# Patient Record
Sex: MALE | Race: BLACK OR AFRICAN AMERICAN
[De-identification: names, ages, dates, MRNs, and addresses within clinical notes are randomized per-mention and may not be internally consistent; named-entity substitution may affect disease eponyms.]

---

## 2020-09-27 ENCOUNTER — HOSPITAL ENCOUNTER (EMERGENCY)
Age: 65
Discharge: HOME | End: 2020-09-27
Payer: MEDICARE

## 2020-09-27 VITALS
DIASTOLIC BLOOD PRESSURE: 68 MMHG | HEART RATE: 74 BPM | SYSTOLIC BLOOD PRESSURE: 108 MMHG | RESPIRATION RATE: 16 BRPM | OXYGEN SATURATION: 95 %

## 2020-09-27 VITALS
OXYGEN SATURATION: 98 % | RESPIRATION RATE: 14 BRPM | SYSTOLIC BLOOD PRESSURE: 80 MMHG | TEMPERATURE: 97.34 F | HEART RATE: 83 BPM | DIASTOLIC BLOOD PRESSURE: 58 MMHG

## 2020-09-27 VITALS — SYSTOLIC BLOOD PRESSURE: 80 MMHG | DIASTOLIC BLOOD PRESSURE: 58 MMHG

## 2020-09-27 VITALS — BODY MASS INDEX: 44.3 KG/M2

## 2020-09-27 DIAGNOSIS — I50.9: ICD-10-CM

## 2020-09-27 DIAGNOSIS — Z79.4: ICD-10-CM

## 2020-09-27 DIAGNOSIS — Z86.73: ICD-10-CM

## 2020-09-27 DIAGNOSIS — Z99.2: ICD-10-CM

## 2020-09-27 DIAGNOSIS — E66.9: ICD-10-CM

## 2020-09-27 DIAGNOSIS — E78.5: ICD-10-CM

## 2020-09-27 DIAGNOSIS — G89.29: ICD-10-CM

## 2020-09-27 DIAGNOSIS — N18.6: ICD-10-CM

## 2020-09-27 DIAGNOSIS — D63.1: ICD-10-CM

## 2020-09-27 DIAGNOSIS — M54.9: ICD-10-CM

## 2020-09-27 DIAGNOSIS — K21.9: ICD-10-CM

## 2020-09-27 DIAGNOSIS — E11.22: ICD-10-CM

## 2020-09-27 DIAGNOSIS — I13.2: ICD-10-CM

## 2020-09-27 DIAGNOSIS — R42: Primary | ICD-10-CM

## 2020-09-27 LAB
ANION GAP: 7 (ref 5–15)
BUN SERPL-MCNC: 26 MG/DL (ref 7–18)
BUN/CREAT RATIO: 3.7 RATIO (ref 10–20)
CALCIUM SERPL-MCNC: 9 MG/DL (ref 8.5–10.1)
CARBON DIOXIDE: 31 MMOL/L (ref 21–32)
CHLORIDE: 96 MMOL/L (ref 98–107)
DEPRECATED RDW RBC: 43.9 FL (ref 35.1–43.9)
ERYTHROCYTE [DISTWIDTH] IN BLOOD: 13.3 % (ref 11.6–14.6)
EST GLOM FILT RATE - AFR AMER: 10 ML/MIN (ref 60–?)
ESTIMATED CREATININE CLEARANCE: 10.45 ML/MIN
GLUCOSE: 124 MG/DL (ref 74–106)
HCT VFR BLD AUTO: 35.7 % (ref 40–54)
HEMOGLOBIN: 11.6 G/DL (ref 13–16.5)
HGB BLD-MCNC: 11.6 G/DL (ref 13–16.5)
IMMATURE GRANULOCYTES COUNT: 0.02 X10^3/UL (ref 0–0)
MCV RBC: 90.6 FL (ref 80–94)
MEAN CORP HGB CONC: 32.5 G/DL (ref 32–36)
MEAN PLATELET VOL.: 9.8 FL (ref 6.2–12)
NRBC FLAGGED BY ANALYZER: 0 % (ref 0–5)
PLATELET # BLD: 273 K/MM3 (ref 150–450)
PLATELET COUNT: 273 K/MM3 (ref 150–450)
POTASSIUM: 3.4 MMOL/L (ref 3.5–5.1)
RBC # BLD AUTO: 3.94 M/MM3 (ref 4.6–6.2)
RBC DISTRIBUTION WIDTH CV: 13.3 % (ref 11.6–14.6)
RBC DISTRIBUTION WIDTH SD: 43.9 FL (ref 35.1–43.9)
WBC # BLD AUTO: 8.3 K/MM3 (ref 4.4–11)
WHITE BLOOD COUNT: 8.3 K/MM3 (ref 4.4–11)

## 2020-09-27 PROCEDURE — A4216 STERILE WATER/SALINE, 10 ML: HCPCS

## 2020-09-27 PROCEDURE — 99283 EMERGENCY DEPT VISIT LOW MDM: CPT

## 2020-09-27 PROCEDURE — 80048 BASIC METABOLIC PNL TOTAL CA: CPT

## 2020-09-27 PROCEDURE — 85025 COMPLETE CBC W/AUTO DIFF WBC: CPT

## 2020-09-27 PROCEDURE — 93005 ELECTROCARDIOGRAM TRACING: CPT

## 2020-10-21 ENCOUNTER — APPOINTMENT (OUTPATIENT)
Dept: GENERAL RADIOLOGY | Age: 65
DRG: 149 | End: 2020-10-21
Payer: MEDICARE

## 2020-10-21 ENCOUNTER — HOSPITAL ENCOUNTER (INPATIENT)
Age: 65
LOS: 1 days | Discharge: HOME | DRG: 149 | End: 2020-10-23
Attending: STUDENT IN AN ORGANIZED HEALTH CARE EDUCATION/TRAINING PROGRAM | Admitting: FAMILY MEDICINE
Payer: MEDICARE

## 2020-10-21 ENCOUNTER — APPOINTMENT (OUTPATIENT)
Dept: MRI IMAGING | Age: 65
DRG: 149 | End: 2020-10-21
Payer: MEDICARE

## 2020-10-21 ENCOUNTER — APPOINTMENT (OUTPATIENT)
Dept: CT IMAGING | Age: 65
DRG: 149 | End: 2020-10-21
Payer: MEDICARE

## 2020-10-21 PROBLEM — G45.9 TIA (TRANSIENT ISCHEMIC ATTACK): Status: ACTIVE | Noted: 2020-10-21

## 2020-10-21 LAB
ABO/RH: NORMAL
ALBUMIN SERPL-MCNC: 3.9 G/DL (ref 3.5–4.6)
ALP BLD-CCNC: 227 U/L (ref 35–104)
ALT SERPL-CCNC: 10 U/L (ref 0–41)
ANION GAP SERPL CALCULATED.3IONS-SCNC: 15 MEQ/L (ref 9–15)
ANTIBODY SCREEN: NORMAL
APTT: 33.6 SEC (ref 24.4–36.8)
AST SERPL-CCNC: 15 U/L (ref 0–40)
BASOPHILS ABSOLUTE: 0.1 K/UL (ref 0–0.2)
BASOPHILS RELATIVE PERCENT: 1 %
BILIRUB SERPL-MCNC: 0.5 MG/DL (ref 0.2–0.7)
BUN BLDV-MCNC: 28 MG/DL (ref 8–23)
C-REACTIVE PROTEIN, HIGH SENSITIVITY: 28.6 MG/L (ref 0–5)
CALCIUM SERPL-MCNC: 9.7 MG/DL (ref 8.5–9.9)
CHLORIDE BLD-SCNC: 93 MEQ/L (ref 95–107)
CHP ED QC CHECK: YES
CO2: 26 MEQ/L (ref 20–31)
CREAT SERPL-MCNC: 7.49 MG/DL (ref 0.7–1.2)
EKG ATRIAL RATE: 77 BPM
EKG P AXIS: 41 DEGREES
EKG P-R INTERVAL: 182 MS
EKG Q-T INTERVAL: 424 MS
EKG QRS DURATION: 120 MS
EKG QTC CALCULATION (BAZETT): 479 MS
EKG R AXIS: -75 DEGREES
EKG T AXIS: 83 DEGREES
EKG VENTRICULAR RATE: 77 BPM
EOSINOPHILS ABSOLUTE: 0 K/UL (ref 0–0.7)
EOSINOPHILS RELATIVE PERCENT: 0.5 %
GFR AFRICAN AMERICAN: 8.9
GFR NON-AFRICAN AMERICAN: 7.3
GLOBULIN: 3.9 G/DL (ref 2.3–3.5)
GLUCOSE BLD-MCNC: 134 MG/DL (ref 60–115)
GLUCOSE BLD-MCNC: 171 MG/DL
GLUCOSE BLD-MCNC: 176 MG/DL (ref 60–115)
GLUCOSE BLD-MCNC: 196 MG/DL (ref 70–99)
GLUCOSE BLD-MCNC: 248 MG/DL (ref 60–115)
HCT VFR BLD CALC: 41.7 % (ref 42–52)
HEMOGLOBIN: 13.4 G/DL (ref 14–18)
INR BLD: 1.1
LYMPHOCYTES ABSOLUTE: 1.5 K/UL (ref 1–4.8)
LYMPHOCYTES RELATIVE PERCENT: 20.9 %
MAGNESIUM: 2.2 MG/DL (ref 1.7–2.4)
MCH RBC QN AUTO: 28.9 PG (ref 27–31.3)
MCHC RBC AUTO-ENTMCNC: 32.2 % (ref 33–37)
MCV RBC AUTO: 89.8 FL (ref 80–100)
MONOCYTES ABSOLUTE: 0.6 K/UL (ref 0.2–0.8)
MONOCYTES RELATIVE PERCENT: 8.6 %
NEUTROPHILS ABSOLUTE: 5.1 K/UL (ref 1.4–6.5)
NEUTROPHILS RELATIVE PERCENT: 69 %
PDW BLD-RTO: 14.7 % (ref 11.5–14.5)
PERFORMED ON: ABNORMAL
PLATELET # BLD: 374 K/UL (ref 130–400)
POTASSIUM SERPL-SCNC: 3.7 MEQ/L (ref 3.4–4.9)
PROTHROMBIN TIME: 14.6 SEC (ref 12.3–14.9)
RBC # BLD: 4.65 M/UL (ref 4.7–6.1)
SODIUM BLD-SCNC: 134 MEQ/L (ref 135–144)
TOTAL CK: 99 U/L (ref 0–190)
TOTAL PROTEIN: 7.8 G/DL (ref 6.3–8)
TROPONIN: 0.31 NG/ML (ref 0–0.01)
WBC # BLD: 7.4 K/UL (ref 4.8–10.8)

## 2020-10-21 PROCEDURE — 84484 ASSAY OF TROPONIN QUANT: CPT

## 2020-10-21 PROCEDURE — 6370000000 HC RX 637 (ALT 250 FOR IP): Performed by: NURSE PRACTITIONER

## 2020-10-21 PROCEDURE — 6360000004 HC RX CONTRAST MEDICATION: Performed by: STUDENT IN AN ORGANIZED HEALTH CARE EDUCATION/TRAINING PROGRAM

## 2020-10-21 PROCEDURE — 70498 CT ANGIOGRAPHY NECK: CPT

## 2020-10-21 PROCEDURE — 83735 ASSAY OF MAGNESIUM: CPT

## 2020-10-21 PROCEDURE — 96374 THER/PROPH/DIAG INJ IV PUSH: CPT

## 2020-10-21 PROCEDURE — 71045 X-RAY EXAM CHEST 1 VIEW: CPT

## 2020-10-21 PROCEDURE — 6360000002 HC RX W HCPCS: Performed by: FAMILY MEDICINE

## 2020-10-21 PROCEDURE — 93010 ELECTROCARDIOGRAM REPORT: CPT | Performed by: INTERNAL MEDICINE

## 2020-10-21 PROCEDURE — 93005 ELECTROCARDIOGRAM TRACING: CPT | Performed by: STUDENT IN AN ORGANIZED HEALTH CARE EDUCATION/TRAINING PROGRAM

## 2020-10-21 PROCEDURE — 70450 CT HEAD/BRAIN W/O DYE: CPT

## 2020-10-21 PROCEDURE — 86850 RBC ANTIBODY SCREEN: CPT

## 2020-10-21 PROCEDURE — G0378 HOSPITAL OBSERVATION PER HR: HCPCS

## 2020-10-21 PROCEDURE — 36415 COLL VENOUS BLD VENIPUNCTURE: CPT

## 2020-10-21 PROCEDURE — 86901 BLOOD TYPING SEROLOGIC RH(D): CPT

## 2020-10-21 PROCEDURE — 86141 C-REACTIVE PROTEIN HS: CPT

## 2020-10-21 PROCEDURE — 82550 ASSAY OF CK (CPK): CPT

## 2020-10-21 PROCEDURE — 85730 THROMBOPLASTIN TIME PARTIAL: CPT

## 2020-10-21 PROCEDURE — 80053 COMPREHEN METABOLIC PANEL: CPT

## 2020-10-21 PROCEDURE — 6370000000 HC RX 637 (ALT 250 FOR IP): Performed by: FAMILY MEDICINE

## 2020-10-21 PROCEDURE — 86900 BLOOD TYPING SEROLOGIC ABO: CPT

## 2020-10-21 PROCEDURE — 6370000000 HC RX 637 (ALT 250 FOR IP): Performed by: INTERNAL MEDICINE

## 2020-10-21 PROCEDURE — 85610 PROTHROMBIN TIME: CPT

## 2020-10-21 PROCEDURE — 70496 CT ANGIOGRAPHY HEAD: CPT

## 2020-10-21 PROCEDURE — 96372 THER/PROPH/DIAG INJ SC/IM: CPT

## 2020-10-21 PROCEDURE — 2580000003 HC RX 258: Performed by: FAMILY MEDICINE

## 2020-10-21 PROCEDURE — 85025 COMPLETE CBC W/AUTO DIFF WBC: CPT

## 2020-10-21 PROCEDURE — 99285 EMERGENCY DEPT VISIT HI MDM: CPT

## 2020-10-21 PROCEDURE — 70551 MRI BRAIN STEM W/O DYE: CPT

## 2020-10-21 RX ORDER — ATORVASTATIN CALCIUM 40 MG/1
40 TABLET, FILM COATED ORAL NIGHTLY
COMMUNITY
Start: 2019-11-19

## 2020-10-21 RX ORDER — DEXTROSE MONOHYDRATE 50 MG/ML
100 INJECTION, SOLUTION INTRAVENOUS PRN
Status: DISCONTINUED | OUTPATIENT
Start: 2020-10-21 | End: 2020-10-23 | Stop reason: HOSPADM

## 2020-10-21 RX ORDER — ATORVASTATIN CALCIUM 40 MG/1
80 TABLET, FILM COATED ORAL NIGHTLY
Status: DISCONTINUED | OUTPATIENT
Start: 2020-10-21 | End: 2020-10-23 | Stop reason: HOSPADM

## 2020-10-21 RX ORDER — ERGOCALCIFEROL (VITAMIN D2) 1250 MCG
50000 CAPSULE ORAL WEEKLY
COMMUNITY

## 2020-10-21 RX ORDER — SENNA PLUS 8.6 MG/1
1 TABLET ORAL NIGHTLY
Status: DISCONTINUED | OUTPATIENT
Start: 2020-10-21 | End: 2020-10-23 | Stop reason: HOSPADM

## 2020-10-21 RX ORDER — PRAVASTATIN SODIUM 40 MG
40 TABLET ORAL DAILY
Status: ON HOLD | COMMUNITY
End: 2020-10-22 | Stop reason: HOSPADM

## 2020-10-21 RX ORDER — MECLIZINE HCL 12.5 MG/1
12.5 TABLET ORAL EVERY 6 HOURS PRN
Status: ON HOLD | COMMUNITY
Start: 2020-09-22 | End: 2020-10-22 | Stop reason: HOSPADM

## 2020-10-21 RX ORDER — FAMOTIDINE 20 MG/1
20 TABLET, FILM COATED ORAL 2 TIMES DAILY
COMMUNITY
Start: 2020-03-13

## 2020-10-21 RX ORDER — ASPIRIN 81 MG/1
81 TABLET ORAL DAILY
Status: DISCONTINUED | OUTPATIENT
Start: 2020-10-21 | End: 2020-10-23 | Stop reason: HOSPADM

## 2020-10-21 RX ORDER — CILOSTAZOL 50 MG/1
50 TABLET ORAL 2 TIMES DAILY
COMMUNITY

## 2020-10-21 RX ORDER — MIDODRINE HYDROCHLORIDE 10 MG/1
10 TABLET ORAL 3 TIMES DAILY
COMMUNITY
Start: 2019-11-19

## 2020-10-21 RX ORDER — OXYCODONE AND ACETAMINOPHEN 7.5; 325 MG/1; MG/1
1 TABLET ORAL EVERY 6 HOURS PRN
Status: ON HOLD | COMMUNITY
Start: 2020-09-16 | End: 2020-10-22 | Stop reason: HOSPADM

## 2020-10-21 RX ORDER — SODIUM CHLORIDE 0.9 % (FLUSH) 0.9 %
10 SYRINGE (ML) INJECTION PRN
Status: DISCONTINUED | OUTPATIENT
Start: 2020-10-21 | End: 2020-10-23 | Stop reason: HOSPADM

## 2020-10-21 RX ORDER — METHOCARBAMOL 500 MG/1
500 TABLET, FILM COATED ORAL 3 TIMES DAILY PRN
COMMUNITY
Start: 2020-08-07

## 2020-10-21 RX ORDER — ONDANSETRON 2 MG/ML
4 INJECTION INTRAMUSCULAR; INTRAVENOUS EVERY 6 HOURS PRN
Status: DISCONTINUED | OUTPATIENT
Start: 2020-10-21 | End: 2020-10-23 | Stop reason: HOSPADM

## 2020-10-21 RX ORDER — NICOTINE POLACRILEX 4 MG
15 LOZENGE BUCCAL PRN
Status: DISCONTINUED | OUTPATIENT
Start: 2020-10-21 | End: 2020-10-23 | Stop reason: HOSPADM

## 2020-10-21 RX ORDER — DOCUSATE SODIUM 100 MG/1
100 CAPSULE, LIQUID FILLED ORAL 2 TIMES DAILY
COMMUNITY

## 2020-10-21 RX ORDER — ASPIRIN 81 MG/1
81 TABLET, CHEWABLE ORAL DAILY
COMMUNITY

## 2020-10-21 RX ORDER — ALPRAZOLAM 0.5 MG/1
0.5 TABLET ORAL ONCE
Status: DISCONTINUED | OUTPATIENT
Start: 2020-10-21 | End: 2020-10-23 | Stop reason: HOSPADM

## 2020-10-21 RX ORDER — VITAMIN B COMPLEX
1 CAPSULE ORAL DAILY
COMMUNITY

## 2020-10-21 RX ORDER — DEXTROSE MONOHYDRATE 25 G/50ML
12.5 INJECTION, SOLUTION INTRAVENOUS PRN
Status: DISCONTINUED | OUTPATIENT
Start: 2020-10-21 | End: 2020-10-23 | Stop reason: HOSPADM

## 2020-10-21 RX ORDER — PROMETHAZINE HYDROCHLORIDE 25 MG/1
12.5 TABLET ORAL EVERY 6 HOURS PRN
Status: DISCONTINUED | OUTPATIENT
Start: 2020-10-21 | End: 2020-10-23 | Stop reason: HOSPADM

## 2020-10-21 RX ORDER — HYDROXYZINE HYDROCHLORIDE 25 MG/1
25 TABLET, FILM COATED ORAL DAILY PRN
COMMUNITY
Start: 2020-07-29

## 2020-10-21 RX ORDER — HEPARIN SODIUM 5000 [USP'U]/ML
5000 INJECTION, SOLUTION INTRAVENOUS; SUBCUTANEOUS EVERY 8 HOURS SCHEDULED
Status: DISCONTINUED | OUTPATIENT
Start: 2020-10-21 | End: 2020-10-23 | Stop reason: HOSPADM

## 2020-10-21 RX ORDER — LORAZEPAM 2 MG/ML
0.5 INJECTION INTRAMUSCULAR ONCE
Status: COMPLETED | OUTPATIENT
Start: 2020-10-21 | End: 2020-10-21

## 2020-10-21 RX ORDER — POLYETHYLENE GLYCOL 3350 17 G/17G
17 POWDER, FOR SOLUTION ORAL DAILY
Status: DISCONTINUED | OUTPATIENT
Start: 2020-10-21 | End: 2020-10-23 | Stop reason: HOSPADM

## 2020-10-21 RX ORDER — CLOPIDOGREL BISULFATE 75 MG/1
75 TABLET ORAL DAILY
COMMUNITY
Start: 2020-05-28

## 2020-10-21 RX ORDER — ASPIRIN 81 MG/1
100 TABLET ORAL 2 TIMES DAILY PRN
Status: ON HOLD | COMMUNITY
Start: 2020-07-29 | End: 2020-10-22 | Stop reason: HOSPADM

## 2020-10-21 RX ORDER — SUCROFERRIC OXYHYDROXIDE 500 MG/1
1 TABLET, CHEWABLE ORAL
COMMUNITY

## 2020-10-21 RX ORDER — POLYETHYLENE GLYCOL 3350 17 G/17G
17 POWDER, FOR SOLUTION ORAL DAILY PRN
Status: DISCONTINUED | OUTPATIENT
Start: 2020-10-21 | End: 2020-10-21

## 2020-10-21 RX ORDER — ONDANSETRON 4 MG/1
4 TABLET, ORALLY DISINTEGRATING ORAL EVERY 4 HOURS PRN
COMMUNITY
Start: 2020-06-16

## 2020-10-21 RX ORDER — NALOXONE HYDROCHLORIDE 4 MG/.1ML
4 SPRAY NASAL ONCE
COMMUNITY
Start: 2020-08-07

## 2020-10-21 RX ORDER — LISINOPRIL 2.5 MG/1
2.5 TABLET ORAL DAILY
COMMUNITY

## 2020-10-21 RX ORDER — HYDROCODONE BITARTRATE AND ACETAMINOPHEN 10; 325 MG/1; MG/1
1 TABLET ORAL 4 TIMES DAILY PRN
Status: ON HOLD | COMMUNITY
Start: 2020-08-18 | End: 2020-10-22 | Stop reason: HOSPADM

## 2020-10-21 RX ORDER — ASPIRIN 300 MG/1
300 SUPPOSITORY RECTAL DAILY
Status: DISCONTINUED | OUTPATIENT
Start: 2020-10-21 | End: 2020-10-23 | Stop reason: HOSPADM

## 2020-10-21 RX ORDER — SODIUM CHLORIDE 0.9 % (FLUSH) 0.9 %
10 SYRINGE (ML) INJECTION EVERY 12 HOURS SCHEDULED
Status: DISCONTINUED | OUTPATIENT
Start: 2020-10-21 | End: 2020-10-23 | Stop reason: HOSPADM

## 2020-10-21 RX ADMIN — IOPAMIDOL 100 ML: 612 INJECTION, SOLUTION INTRAVENOUS at 12:33

## 2020-10-21 RX ADMIN — LORAZEPAM 0.5 MG: 2 INJECTION INTRAMUSCULAR; INTRAVENOUS at 15:18

## 2020-10-21 RX ADMIN — HEPARIN SODIUM 5000 UNITS: 5000 INJECTION INTRAVENOUS; SUBCUTANEOUS at 19:38

## 2020-10-21 RX ADMIN — Medication 10 ML: at 22:37

## 2020-10-21 RX ADMIN — POLYETHYLENE GLYCOL 3350 17 G: 17 POWDER, FOR SOLUTION ORAL at 19:14

## 2020-10-21 RX ADMIN — Medication 8.6 MG: at 22:36

## 2020-10-21 RX ADMIN — INSULIN LISPRO 2 UNITS: 100 INJECTION, SOLUTION INTRAVENOUS; SUBCUTANEOUS at 22:36

## 2020-10-21 RX ADMIN — ATORVASTATIN CALCIUM 80 MG: 40 TABLET, FILM COATED ORAL at 22:36

## 2020-10-21 ASSESSMENT — PAIN DESCRIPTION - PROGRESSION: CLINICAL_PROGRESSION: NOT CHANGED

## 2020-10-21 ASSESSMENT — PAIN DESCRIPTION - PAIN TYPE
TYPE: ACUTE PAIN
TYPE: ACUTE PAIN

## 2020-10-21 ASSESSMENT — ENCOUNTER SYMPTOMS
ABDOMINAL PAIN: 0
TROUBLE SWALLOWING: 0
COUGH: 0
VOMITING: 0
DIARRHEA: 0
SHORTNESS OF BREATH: 0
CHEST TIGHTNESS: 0
BACK PAIN: 0
ALLERGIC/IMMUNOLOGIC NEGATIVE: 1
CONSTIPATION: 1
RESPIRATORY NEGATIVE: 1
SINUS PRESSURE: 0

## 2020-10-21 ASSESSMENT — PAIN DESCRIPTION - LOCATION
LOCATION: LEG
LOCATION: LEG;BACK

## 2020-10-21 ASSESSMENT — PAIN DESCRIPTION - ORIENTATION
ORIENTATION: LEFT
ORIENTATION: LEFT

## 2020-10-21 ASSESSMENT — PAIN SCALES - GENERAL: PAINLEVEL_OUTOF10: 8

## 2020-10-21 ASSESSMENT — PAIN DESCRIPTION - ONSET: ONSET: ON-GOING

## 2020-10-21 NOTE — ED NOTES
Security has pt wallet with cash in it     Valeriano Dietrich, [facility]  10/21/20 9382

## 2020-10-21 NOTE — ACP (ADVANCE CARE PLANNING)
Advance Care Planning     Advance Care Planning Activator (Inpatient)  Conversation Note      Date of ACP Conversation: 10/21/2020    Conversation Conducted with: Patient with Decision Making Capacity    ACP Activator: [address] makes decisions on behalf of the incapacitated patient: Decision Maker is asked to consider and make decisions based on patient values, known preferences, or best interests. Health Care Decision Maker:     Current Designated Health Care Decision Maker:   (If there is a valid [address] named in the [address] [place] [address] Makers\" box in the ACP activity, but it is not visible above, be sure to open that field and then select the health care decision maker relationship (ie \"primary\") in the blank space to the right of the name.) Validate  this information as still accurate & up-to-date; edit [address] field as needed.)    Note: Assess and validate information in current ACP documents, as indicated. If no Decision Maker listed above or available through scanned documents, then:    If no Authorized Decision Maker has previously been identified, then patient chooses [address]:  \"Who would you like to name as your primary health care decision-maker? \"               Name: Latrice Mason        Relationship: brother          Phone number: [phone]  Jerry Grimaldo this person be reached easily? \" Yes     Note: If the relationship of these Decision-Makers to the patient does NOT follow your state's Next of Kin hierarchy, recommend that patient complete ACP document that meets state-specific requirements to allow them to act on the patient's behalf when appropriate. Care Preferences    Ventilation: \"If you were in your present state of health and suddenly became very ill and were unable to breathe on your own, what would your preference be about the use of a ventilator (breathing machine) if it were available to you? \" Would the patient desire the use of ventilator (breathing machine)?: yes    \"If your health worsens and it becomes clear that your chance of recovery is unlikely, what would your preference be about the use of a ventilator (breathing machine) if it were available to you? \"     Would the patient desire the use of ventilator (breathing machine)?: Yes      Resuscitation  \"CPR works best to restart the heart when there is a sudden event, like a heart attack, in someone who is otherwise healthy. Unfortunately, CPR does not typically restart the heart for people who have serious health conditions or who are very sick. \"    \"In the event your heart stopped as a result of an underlying serious health condition, would you want attempts to be made to restart your heart (answer \"yes\" for attempt to resuscitate) or would you prefer a natural death (answer \"no\" for do not attempt to resuscitate)? \" yes      NOTE: If the patient has a valid advance directive AND now provides care preference(s) that are inconsistent with that prior directive, advise the patient to consider either: creating a new advance directive that complies with state-specific requirements; or, if that is not possible, orally revoking that prior directive in accordance with state-specific requirements, which must be documented in the EHR. [x] Yes   [] No   Educated Patient / Macy Byrne regarding differences between Advance Directives and portable DNR orders.     Length of ACP Conversation in minutes:  10  Conversation Outcomes:  [x] ACP discussion completed  [] Existing advance directive reviewed with patient; no changes to patient's previously recorded wishes  [] New Advance Directive completed  [] Portable Do Not Rescitate prepared for Provider review and signature  [] POLST/POST/MOLST/MOST prepared for Provider review and signature      Follow-up plan:    [] Schedule follow-up conversation to continue planning  [] Referred individual to Provider for additional questions/concerns   [] Advised patient/agent/surrogate to review completed ACP document and update if needed with changes in condition, patient preferences or care setting    [x] This note routed to one or more involved healthcare providers

## 2020-10-21 NOTE — H&P
Hospitalist History and Physical  Name: Christopher Morris  Age: 71 y.o. Gender: male  CodeStatus: Full Code  Allergies: No Known Allergies    Chief Complaint:Dizziness (at [facility], worse when moving head)    Primary Care Provider: Josiah España MD  InpatientTreatment Team: Treatment Team: Attending Provider: Claudetta Pallas, DO; Consulting Physician: Dariela Henderson MD  Admission Date: 10/21/2020      Subjective: Patient is a 43-year-old male with past medical history of CVA with residual right sided weakness, end-stage renal disease on hemodialysis, type 2 diabetes, hypertension and hyperlipidemia and chronic vertigo who presented to the emergency room from [facility] with complaints of dizziness and blurred vision. Per EMR patient has been seen multiple times for dizziness and diplopia. Patient recently prescribed meclizine from [facility] on 9/27/2020. Patient has diagnosis of vertigo since 2014. Exam is nonfocal.  No neurological deficits noted other than the residual right-sided weakness. Today patient's main concern is his left hip pain and constipation. Patient also seen at [facility] wound care center for MSSA wound infection of left thigh. Wound is healed and no sign of cellulitis. Patient has recently finished vancomycin. Patient states that he had a bowel movement yesterday after 2 enemas however still feels constipated. Patient notes hemodialysis yesterday and removal of 2 L. Which may contribute to his dizziness. Physical Exam  Constitutional:       Appearance: He is obese. HENT:      Head: Normocephalic and atraumatic. Right Ear: External ear normal.      Left Ear: External ear normal.      Mouth/Throat:      Pharynx: Oropharynx is clear. Eyes:      Extraocular Movements: Extraocular movements intact. Conjunctiva/sclera: Conjunctivae normal.      Pupils: Pupils are equal, round, and reactive to light.    Neck:      Musculoskeletal: Normal range of motion. Cardiovascular:      Rate and Rhythm: Normal rate and regular rhythm. Pulses: Normal pulses. Heart sounds: Normal heart sounds. Pulmonary:      Effort: Pulmonary effort is normal.      Breath sounds: Normal breath sounds. Abdominal:      General: Bowel sounds are normal.      Palpations: Abdomen is soft. Musculoskeletal: Normal range of motion. Skin:     General: Skin is warm. Capillary Refill: Capillary refill takes less than 2 seconds. Findings: Lesion present. Neurological:      General: No focal deficit present. Mental Status: He is alert and oriented to person, place, and time. Psychiatric:         Mood and Affect: Mood normal.         Review of Systems   Constitutional: Negative. HENT: Negative. Eyes: Positive for visual disturbance. Respiratory: Negative. Cardiovascular: Negative. Gastrointestinal: Positive for constipation. Endocrine: Negative. Genitourinary: Negative. Musculoskeletal: Positive for arthralgias. Skin: Negative. Allergic/Immunologic: Negative. Neurological: Positive for dizziness. Hematological: Negative. Psychiatric/Behavioral: Negative. Medications:  Reviewed     No current facility-administered medications on file prior to encounter. No current outpatient medications on file prior to encounter. Past Medical History:   Diagnosis Date    Arthritis     Cerebral artery occlusion with cerebral infarction ([address]     CH[facility] failure[facility]     Diabetes mellitus [facility]     Hypertension     Renal disorder        Past Surgical History:   Procedure Laterality Date    DIALYSIS FISTULA CREATION          History reviewed. No pertinent family history.      Social History     Socioeconomic History    Marital status:      Spouse name: Not on file    Number of children: Not on file    Years of education: Not on file    Highest education level: Not on file   Occupational History    Not on file   Social Needs    Financial resource strain: Not on file    Food insecurity     Worry: Not on file     Inability: Not on file    Transportation needs     Medical: Not on file     Non-medical: Not on file   Tobacco Use    Smoking status: Never Smoker    Smokeless tobacco: Former User   Substance and Sexual Activity    Alcohol use: Not on file     Comment: rarely    Drug use: Never    Sexual activity: Not on file   Lifestyle    Physical activity     Days per week: Not on file     Minutes per session: Not on file    Stress: Not on file   Relationships    Social connections     Talks on phone: Not on file     Gets together: Not on file     Attends Yarsanism service: Not on file     Active member of club or organization: Not on file     Attends meetings of clubs or organizations: Not on file     Relationship status: Not on file    Intimate partner violence     Fear of current or ex partner: Not on file     Emotionally abused: Not on file     Physically abused: Not on file     Forced sexual activity: Not on file   Other Topics Concern    Not on file   Social History Narrative    Not on file        Infusion Medications:   Scheduled Medications:    sodium chloride flush  10 mL Intravenous 2 times per day    heparin (porcine)  5,000 Units Subcutaneous 3 times per day    aspirin  81 mg Oral Daily    Or    aspirin  300 mg Rectal Daily    atorvastatin  80 mg Oral Nightly     PRN Meds: sodium chloride flush, polyethylene glycol, promethazine **OR** ondansetron    Labs:   Recent Labs     10/21/20  1217   WBC 7.4   HGB 13.4*   HCT 41.7*        Recent Labs     10/21/20  1216   *   K 3.7   CL 93*   CO2 26   BUN 28*   CREATININE 7.49*   CALCIUM 9.7     Recent Labs     10/21/20  1216   AST 15   ALT 10   BILITOT 0.5   ALKPHOS 227*     Recent Labs     10/21/20  1216   INR 1.1     Recent Labs     10/21/20  1216   CKTOTAL 99   TROPONINI 0.308*       Urinalysis:   No results found for: Whit Justus, [address], [facility] [address]    Radiology:   Most recent    Chest CT      WITH CONTRAST:No results found for this or any previous visit. WITHOUT CONTRAST: No results found for this or any previous visit. CXR      2-view: No results found for this or any previous visit. Portable:   Results for orders placed during the hospital encounter of 10/21/20   XR CHEST PORTABLE    Narrative EXAMINATION: CHEST PORTABLE VIEW     CLINICAL HISTORY: CODE BAT    COMPARISONS: None     FINDINGS:    Single  views of the chest is submitted. The cardiac silhouette is of normal size configuration. Pulmonary vascular unremarkable. Right sided trachea. No focal infiltrates. No Pneumothoraces. Impression NO ACUTE ACTIVE CARDIOPULMONARY PROCESS       Echo No results found for this or any previous visit. Assessment/Plan:    # Suspected TIA; NIHSS negative for acute neurological deficits. Will obtain MRI. Telemetry, ASA, Statin, Neuro on board. PT OT to evaluate for deficits and to assess and make recommendations. Monitor BP closely. Neuro checks Q4hrs. Fall precautions. Bedside swallow eval prior to starting PO diet. # NSTEMI II most likely; Elevated trops likely due to accumulation secondary to decreased renal excretion. Will trend and continue to assess. May need cardiology consult to exclude ACS or CAD. Goal K and Mg 4.0 and 2.0, respectively. If indicated, will need to replace K carefully in setting of renal insufficiency. EKG in AM. Telemetry ordered. # End-stage renal disease: Consult to nephrology for hemodialysis on Tuesdays, Thursdays and Saturdays. Left upper extremity fistula    # DMII with hyperglycemia: ISS, hypoglycemia protocol POCT Glucose TIDAC & QHS    # Secondary HTN: Stable. Will review home medications prior to resuming. # Constipation: Will order Senna S.  Bowel Regimen PRN. Hold for loose stools or diarrhea. I personally spent estimated 60 minutes with this patient today. Additional work up or/and treatment plan may be added today or then after based on clinical progression. I am managing a portion of pt care. Some medical issues are handled by other specialists. Additional work up and treatment should be done in out pt setting by pt PCP and other out pt providers. In addition to examining and evaluating pt, I spent additional time explaining care, normaland abnormal findings, and treatment plan. All of pt questions were answered. Counseling, diet and education were provided. Case will be discussed with nursing staff when appropriate. Family will be updated if and when appropriate. Electronically signed by RANDA Oakes CNP on 10/21/2020 at 2:31 PM     Attending Supervising Physician's CARISSA Harding  I performed a history and physical examination on the patient and discussed the management with the nurse practitioner. I reviewed and agree with the findings and plan as documented in her note . Patient seen and examined, no focal neuro deficits noted on current exam.  TIA workup in place.     Electronically signed by Corbett Cogan, MD on 10/22/20 at 3:02 AM EDT

## 2020-10-21 NOTE — ED PROVIDER NOTES
[address] [facility] ED  eMERGENCY dEPARTMENT eNCOUnter      Pt Name: Concepcion Paez  MRN: [id number]  [place] 1955  Date of evaluation: 10/21/2020  Provider: Caitlin Dalal, [facility]       Chief Complaint   Patient presents with    Dizziness     at [facility], worse when moving head         HISTORY OF PRESENT ILLNESS   (Location/Symptom, Timing/Onset,Context/Setting, Quality, Duration, Modifying Factors, Severity)  Note limiting factors. Concepcion Paez is a 71 y.o. male who presents to the emergency department complaint of dizziness, that started prior to arrival.  Patient was at [facility] outpatient being seen for chronic left hip pain and also because the patient. Patient states to be can become very dizzy. He does not know (contrary to the nurses notes) if head or eye movement made it any better or worse. The patient states at the time of the dizziness his vision got very blurred. On physical exam the patient has ataxia of the left upper extremity with finger-nose-finger but not with the right upper extremity. Patient denies any fever, chills or cough. Patient denies any headache. Patient's last dialysis was yesterday and they took 2 L of fluids off. Patient is from [facility]. He states he is getting a second opinion on his left hip today. While in the ER the patient's blood pressure is less than 100. Patient states he usually takes a medicine to increase his blood pressure and he had forgotten to take it today. He states that his blood pressure in the 90s is normal for him. Patient states he has had x-rays of the left hip and they have found nothing wrong with him. The history is provided by the patient. NursingNotes were reviewed. REVIEW OF SYSTEMS    (2-9 systems for level 4, 10 or more for level 5)     Review of Systems   Constitutional: Negative for activity change, appetite change, chills, fever and unexpected weight change.    HENT: Negative for drooling, ear pain, nosebleeds, sinus pressure and trouble swallowing. Respiratory: Negative for cough, chest tightness and shortness of breath. Cardiovascular: Negative for chest pain and leg swelling. Gastrointestinal: Negative for abdominal pain, diarrhea and vomiting. Endocrine: Negative for polydipsia and polyphagia. Genitourinary: Negative for dysuria, flank pain and frequency. Musculoskeletal: Negative for back pain and myalgias. Skin: Negative for pallor and rash. Neurological: Positive for dizziness. Negative for syncope, weakness and headaches. Hematological: Does not bruise/bleed easily. All other systems reviewed and are negative. Except as noted above the remainder of the review of systems was reviewed and negative. PAST MEDICAL HISTORY     Past Medical History:   Diagnosis Date    Arthritis     [facility]erebral artery occlusion with [facility] [address]     CHF (congestive heart failure) [facility]     Diabetes mellitus ([facility] [address] [facility]     Hypertension     Renal disorder          SURGICALHISTORY       Past Surgical History:   Procedure Laterality Date    DIALYSIS FISTULA CREATION           CURRENT MEDICATIONS       Previous Medications    No medications on file       ALLERGIES     Patient has no known allergies. FAMILY HISTORY     History reviewed. No pertinent family history.        SOCIAL HISTORY       Social History     Socioeconomic History    Marital status:      Spouse name: None    Number of children: None    Years of education: None    Highest education level: None   Occupational History    None   Social Needs    Financial resource strain: None    Food insecurity     Worry: None     Inability: None    Transportation needs     Medical: None     Non-medical: None   Tobacco Use    Smoking status: Never Smoker    Smokeless tobacco: Former User   Substance and Sexual Activity    Alcohol use: None     Comment: rarely    Drug use: Never    Sexual activity: None   Lifestyle    Physical activity     Days per week: None     Minutes per session: None    Stress: None   Relationships    Social connections     Talks on phone: None     Gets together: None     Attends Denominational service: None     Active member of club or organization: None     Attends meetings of clubs or organizations: None     Relationship status: None    Intimate partner violence     Fear of current or ex partner: None     Emotionally abused: None     Physically abused: None     Forced sexual activity: None   Other Topics Concern    None   Social History Narrative    None       SCREENINGS   NIH Stroke Scale  NIH Stroke Scale Assessed: Yes  Interval: Baseline  Level of Consciousness (1a. ): Alert  LOC Questions (1b. ): Answers both correctly  LOC Commands (1c. ): Performs both tasks correctly  Best Gaze (2. ): Normal  Visual (3. ): No visual loss  Facial Palsy (4. ): Normal symmetrical movement  Motor Arm, Left (5a. ): No drift  Motor Arm, Right (5b. ): No drift  Motor Leg, Left (6a. ): No drift  Motor Leg, Right (6b. ): No drift  Limb Ataxia (7. ): (!) Present in one limb(Left upper extremity)  Sensory (8. ): Normal  Best Language (9. ): No aphasia  Dysarthria (10. ): Normal  Extinction and Inattention (11): No abnormality  Total: 1Glasgow Coma Scale  Eye Opening: Spontaneous  Best Verbal Response: Oriented  Best Motor Response: Obeys commands  Lauren Coma Scale Score: 15 @FLOW([id number])@      PHYSICAL EXAM    (up to 7 for level 4, 8 or more for level 5)     ED Triage Vitals [10/21/20 1121]   BP Temp Temp Source Pulse Resp SpO2 Height Weight   99/67 97.6 °F (36.4 °C) Oral 76 16 100 % 5' 9\" (1.753 m) 290 lb (131.5 kg)       Physical Exam  Vitals signs and nursing note reviewed. Exam conducted with a chaperone present. Constitutional:       General: He is awake. He is not in acute distress. Appearance: Normal appearance. He is well-developed and well-groomed. He is morbidly obese.  He is not note:    XR CHEST PORTABLE   Final Result   NO ACUTE ACTIVE CARDIOPULMONARY PROCESS      CTA NECK W WO CONTRAST   Final Result   CTA HEAD AND CTA NECK No large vessel occlusion or significant stenosis      EXAMINATION: CTA NECK W WO CONTRAST CTA HEAD      DATE AND TIME:10/21/2020 12:24 PM      CLINICAL HISTORY: Stroke  dizzy, left arm ataxia        COMPARISON: None      TECHNIQUE:Helical CTA of the head and neck was performed from the vertex to the aortic arch following the uneventful intravenous administration of 100 cc of nonionic contrast without incident. 2-D images were reconstructed in the sagittal and coronal    planes. Three Dimensional Maximum Intensity Projection (3D-MIP) images were created. All images were reviewed and primarily archived to PACS workstation. All CT scans at this facility use dose modulation, iterative reconstruction, and/or weight based    dosing when appropriate to reduce radiation dose to as low as reasonably achievable. NASCET Criteria were utilized         FINDINGS CTA Head           Intracranial ICAs: Normal flow in precavernous, cavernous, clinoid and supraclinoid segments of the internal carotid arteries bilaterally          Anterior cerebral arteries:No occlusion or significant stenosis. Middle cerebral arteries:No large vessel occlusion or significant stenosis. Posterior Circulation: No large vessel occlusion or significant stenosis. Basilar artery:No significant stenosis. Aneurysm No aneurysm or dissection in the anterior or posterior circulations. .          Neurocranium remote left occipital infarct          Dural sinus: Dural venous sinuses are unremarkable. EXAMINATION: CTA NECK W WO CONTRAST.  CTA NECK      DATE AND TIME:10/21/2020 12:24 PM      CLINICAL HISTORY: Stroke symptoms   dizzy, left arm ataxia        TECHNIQUE: TECHNIQUE:Helical CTA of the head and neck was performed from the vertex to the aortic arch following the uneventful intravenous administration of 100 cc of nonionic contrast without incident. 2-D images were reconstructed in the sagittal and    coronal planes. Three Dimensional Maximum Intensity Projection (3D-MIP) images were created. All images were reviewed and primarily archived to PACS workstation. All CT scans at this facility use dose modulation, iterative reconstruction, and/or weight    based dosing when appropriate to reduce radiation dose to as low as reasonably achievable. NASCET Criteria were utilized      Result / Findings:      CTA NECK:      Aortic Arch: No stenosis aneurysm or dissection. Carotid:      Right  Carotid Stenosis (% by NASCET Criteria):  Less than 50%               Left  Carotid Stenosis (% by NASCET Criteria):  Less than 50%              Cervical Vertebral Arteries:         Patency: No stenosis aneurysm or dissection. Vertebral arteries are codominant. CT HEAD WO CONTRAST   Final Result      NO ACUTE INTRA-AXIAL OR EXTRA-AXIAL FINDINGS. IF SIGNS OR SYMPTOMS PERSIST THEN CONSIDER MRI TO FURTHER EVALUATE IF THERE ARE NO CONTRAINDICATIONS      All CT scans at this facility use dose modulation, iterative reconstruction, and/or weight based dosing when appropriate to reduce radiation dose to as low as reasonably achievable.             CTA HEAD W WO CONTRAST    (Results Pending)   MRI brain without contrast    (Results Pending)         ED BEDSIDE ULTRASOUND:   Performed by ED Physician - none    LABS:  Labs Reviewed   CBC WITH AUTO DIFFERENTIAL - Abnormal; Notable for the following components:       Result Value    RBC 4.65 (*)     Hemoglobin 13.4 (*)     Hematocrit 41.7 (*)     MCHC 32.2 (*)     RDW 14.7 (*)     All other components within normal limits   COMPREHENSIVE METABOLIC PANEL - Abnormal; Notable for the following components:    Sodium 134 (*)     Chloride 93 (*)     Glucose 196 (*)     BUN 28 (*)     CREATININE 7.49 (*)     GFR Non- 7.3 (*) GFR  8.9 (*)     Alkaline Phosphatase 227 (*)     Globulin 3.9 (*)     All other components within normal limits    Narrative:     Arlene Sykes  ED tel. [phone],  crea results [facility] and [facility] back [facility], 10/21/2020  13:20, by [facility]  trop results called to and read back by [facility], 10/21/2020 13:19,  by [facility]   HIGH SENSITIVITY CRP - Abnormal; Notable for the following components:    CRP High Sensitivity 28.6 (*)     All other components within normal limits   TROPONIN - Abnormal; Notable for the following components:    Troponin 0.308 (*)     All other components within normal limits    Narrative:     Carolyn Huang tel. [phone],  trop results called to and read back by [facility], 10/21/2020 13:19,  by [facility]   POCT GLUCOSE - Abnormal; Notable for the following components:    POC Glucose 176 (*)     All other components within normal limits   POCT GLUCOSE - Normal   APTT   CK    Narrative:     CALL  [place]  [facility] tel. [phone],  crea results called to and read back by [facility], 10/21/2020  13:20, by [facility]  trop results called to and read back by [facility], 10/21/2020 13:19,  by [facility]   MAGNESIUM    Narrative:     Arlene Sykes  [facility] tel. [phone],  crea results called to and read back by [facility], 10/21/2020  13:20, by [facility]  trop results called to and read back by [facility], 10/21/2020 13:19,  by [address]       All other labs were within normal range or not returned as of this dictation. EMERGENCY DEPARTMENT COURSE and DIFFERENTIAL DIAGNOSIS/MDM:   Vitals:    Vitals:    10/21/20 1244 10/21/20 1255 10/21/20 1322 10/21/20 1330   BP: 100/75 100/79 99/69 94/66   Pulse:  81 78 79   Resp:  13 19 15   Temp:       TempSrc:       SpO2:  96%     Weight:       Height:               MDM  Patient has the constellation of symptoms of vertigo, blurred vision, and left arm ataxia.   I believe this

## 2020-10-21 NOTE — ED NOTES
Bed: 21  Expected date:   Expected time:   Means of arrival:   Comments:  64(M) leg pain/dizziness/constipation  124/69, 96% RA, 84 SR  Coming from [address] [place] [address]  10/21/20 1113

## 2020-10-21 NOTE — ED TRIAGE NOTES
PT to ed from [facility] with reports of dizziness with blurry vision while at the clinic. PT repots that he was being seen for left leg pain and constipation. PT reports constipation for  Week, enema x2 and bm yesterday. Pthypotensive, with hx of renal failyre, and HD. PT is T Th Sat, last session yesterday. PT skin very dry.  Pt AOx3, resps even and unlabored

## 2020-10-21 NOTE — ED NOTES
Pako called to Kash Collins 2W,tele box sent to [facility], RN  10/21/20 [address] [place] [address]  10/21/20 4224

## 2020-10-22 LAB
CHOLESTEROL, TOTAL: 131 MG/DL (ref 0–199)
GLUCOSE BLD-MCNC: 162 MG/DL (ref 60–115)
GLUCOSE BLD-MCNC: 222 MG/DL (ref 60–115)
GLUCOSE BLD-MCNC: 313 MG/DL (ref 60–115)
HBA1C MFR BLD: 7.5 % (ref 4.8–5.9)
HCT VFR BLD CALC: 38.1 % (ref 42–52)
HDLC SERPL-MCNC: 30 MG/DL (ref 40–59)
HEMOGLOBIN: 12.5 G/DL (ref 14–18)
LDL CHOLESTEROL CALCULATED: 75 MG/DL (ref 0–129)
MCH RBC QN AUTO: 29.4 PG (ref 27–31.3)
MCHC RBC AUTO-ENTMCNC: 32.7 % (ref 33–37)
MCV RBC AUTO: 89.9 FL (ref 80–100)
PDW BLD-RTO: 14.4 % (ref 11.5–14.5)
PERFORMED ON: ABNORMAL
PLATELET # BLD: 299 K/UL (ref 130–400)
RBC # BLD: 4.24 M/UL (ref 4.7–6.1)
TRIGL SERPL-MCNC: 132 MG/DL (ref 0–150)
TROPONIN: 0.27 NG/ML (ref 0–0.01)
TROPONIN: 0.29 NG/ML (ref 0–0.01)
WBC # BLD: 5.5 K/UL (ref 4.8–10.8)

## 2020-10-22 PROCEDURE — 6370000000 HC RX 637 (ALT 250 FOR IP): Performed by: INTERNAL MEDICINE

## 2020-10-22 PROCEDURE — 6360000002 HC RX W HCPCS: Performed by: FAMILY MEDICINE

## 2020-10-22 PROCEDURE — 6370000000 HC RX 637 (ALT 250 FOR IP): Performed by: FAMILY MEDICINE

## 2020-10-22 PROCEDURE — 84484 ASSAY OF TROPONIN QUANT: CPT

## 2020-10-22 PROCEDURE — 80061 LIPID PANEL: CPT

## 2020-10-22 PROCEDURE — G0378 HOSPITAL OBSERVATION PER HR: HCPCS

## 2020-10-22 PROCEDURE — 97162 PT EVAL MOD COMPLEX 30 MIN: CPT

## 2020-10-22 PROCEDURE — 99222 1ST HOSP IP/OBS MODERATE 55: CPT | Performed by: PSYCHIATRY & NEUROLOGY

## 2020-10-22 PROCEDURE — APPSS45 APP SPLIT SHARED TIME 31-45 MINUTES: Performed by: NURSE PRACTITIONER

## 2020-10-22 PROCEDURE — 6370000000 HC RX 637 (ALT 250 FOR IP): Performed by: NURSE PRACTITIONER

## 2020-10-22 PROCEDURE — 83036 HEMOGLOBIN GLYCOSYLATED A1C: CPT

## 2020-10-22 PROCEDURE — 2580000003 HC RX 258: Performed by: FAMILY MEDICINE

## 2020-10-22 PROCEDURE — 5A1D70Z PERFORMANCE OF URINARY FILTRATION, INTERMITTENT, LESS THAN 6 HOURS PER DAY: ICD-10-PCS | Performed by: INTERNAL MEDICINE

## 2020-10-22 PROCEDURE — 90935 HEMODIALYSIS ONE EVALUATION: CPT

## 2020-10-22 PROCEDURE — 85027 COMPLETE CBC AUTOMATED: CPT

## 2020-10-22 PROCEDURE — 96372 THER/PROPH/DIAG INJ SC/IM: CPT

## 2020-10-22 PROCEDURE — 36415 COLL VENOUS BLD VENIPUNCTURE: CPT

## 2020-10-22 RX ORDER — MIDODRINE HYDROCHLORIDE 5 MG/1
10 TABLET ORAL
Status: DISCONTINUED | OUTPATIENT
Start: 2020-10-22 | End: 2020-10-23 | Stop reason: HOSPADM

## 2020-10-22 RX ORDER — MECLIZINE HYDROCHLORIDE 25 MG/1
25 TABLET ORAL 3 TIMES DAILY PRN
Qty: 30 TABLET | Refills: 1 | Status: SHIPPED | OUTPATIENT
Start: 2020-10-22 | End: 2020-11-01

## 2020-10-22 RX ORDER — MECLIZINE HYDROCHLORIDE 25 MG/1
25 TABLET ORAL EVERY 6 HOURS SCHEDULED
Status: DISCONTINUED | OUTPATIENT
Start: 2020-10-22 | End: 2020-10-23 | Stop reason: HOSPADM

## 2020-10-22 RX ORDER — BISACODYL 10 MG
10 SUPPOSITORY, RECTAL RECTAL DAILY PRN
Status: DISCONTINUED | OUTPATIENT
Start: 2020-10-22 | End: 2020-10-23 | Stop reason: HOSPADM

## 2020-10-22 RX ORDER — MIDODRINE HYDROCHLORIDE 5 MG/1
5 TABLET ORAL
Status: DISCONTINUED | OUTPATIENT
Start: 2020-10-22 | End: 2020-10-22

## 2020-10-22 RX ORDER — HEPARIN SODIUM 1000 [USP'U]/ML
2000 INJECTION, SOLUTION INTRAVENOUS; SUBCUTANEOUS PRN
Status: DISCONTINUED | OUTPATIENT
Start: 2020-10-22 | End: 2020-10-23 | Stop reason: HOSPADM

## 2020-10-22 RX ORDER — SEVELAMER CARBONATE 800 MG/1
2400 TABLET, FILM COATED ORAL
Status: DISCONTINUED | OUTPATIENT
Start: 2020-10-22 | End: 2020-10-23 | Stop reason: HOSPADM

## 2020-10-22 RX ADMIN — POLYETHYLENE GLYCOL 3350 17 G: 17 POWDER, FOR SOLUTION ORAL at 16:44

## 2020-10-22 RX ADMIN — SEVELAMER CARBONATE 2400 MG: 800 TABLET, FILM COATED ORAL at 16:40

## 2020-10-22 RX ADMIN — MIDODRINE HYDROCHLORIDE 5 MG: 5 TABLET ORAL at 12:17

## 2020-10-22 RX ADMIN — BISACODYL 10 MG: 10 SUPPOSITORY RECTAL at 20:49

## 2020-10-22 RX ADMIN — HEPARIN SODIUM 5000 UNITS: 5000 INJECTION INTRAVENOUS; SUBCUTANEOUS at 05:42

## 2020-10-22 RX ADMIN — MECLIZINE HYDROCHLORIDE 25 MG: 25 TABLET ORAL at 12:17

## 2020-10-22 RX ADMIN — ASPIRIN 81 MG: 81 TABLET, COATED ORAL at 08:48

## 2020-10-22 RX ADMIN — ATORVASTATIN CALCIUM 80 MG: 40 TABLET, FILM COATED ORAL at 20:49

## 2020-10-22 RX ADMIN — MIDODRINE HYDROCHLORIDE 10 MG: 5 TABLET ORAL at 16:39

## 2020-10-22 RX ADMIN — INSULIN LISPRO 4 UNITS: 100 INJECTION, SOLUTION INTRAVENOUS; SUBCUTANEOUS at 20:50

## 2020-10-22 RX ADMIN — MECLIZINE HYDROCHLORIDE 25 MG: 25 TABLET ORAL at 16:39

## 2020-10-22 RX ADMIN — Medication 8.6 MG: at 20:49

## 2020-10-22 RX ADMIN — HEPARIN SODIUM 5000 UNITS: 5000 INJECTION INTRAVENOUS; SUBCUTANEOUS at 20:49

## 2020-10-22 RX ADMIN — Medication 10 ML: at 20:50

## 2020-10-22 ASSESSMENT — ENCOUNTER SYMPTOMS
EYE DISCHARGE: 0
WHEEZING: 0
APNEA: 0
VOMITING: 0
CONSTIPATION: 0
ABDOMINAL DISTENTION: 0
TROUBLE SWALLOWING: 0
BACK PAIN: 1
CHEST TIGHTNESS: 0
NAUSEA: 0
SHORTNESS OF BREATH: 0
COLOR CHANGE: 0
COUGH: 0

## 2020-10-22 ASSESSMENT — PAIN DESCRIPTION - ORIENTATION: ORIENTATION: LEFT

## 2020-10-22 ASSESSMENT — PAIN DESCRIPTION - PAIN TYPE: TYPE: ACUTE PAIN

## 2020-10-22 ASSESSMENT — PAIN SCALES - GENERAL
PAINLEVEL_OUTOF10: 0
PAINLEVEL_OUTOF10: 6

## 2020-10-22 ASSESSMENT — PAIN DESCRIPTION - LOCATION: LOCATION: HIP

## 2020-10-22 NOTE — CONSULTS
[facility]phrology  Consult Note           Reason for Consult: End-stage renal disease  Requesting Physician:  Dr. Skyla White    Chief Complaint: Dizziness  History Obtained From:  patient, electronic medical record    History of Present Ilness:    71y.o. year old male admitted with increased dizziness as well as left hip pain. Patient is on hemodialysis 3 times a week Tuesday Thursday Saturday at the dialysis unit in [place]. Usually gets dialysis for 5 hours. Has chronically low blood pressure maintained on midodrine. Also on very low-dose of lisinopril. Has been on dialysis for about 11 years. His dialysis access is a left AV fistula. He is not on the transplant list due to his other comorbidities. Last dialysis was Tuesday. He says about 2 L were removed. Comes in with dizziness and left hip pain. Recently was on antibiotics for a infection. Neurology has been consulted    Past Medical History:        Diagnosis Date    Arthritis     [facility]erebral [facility] [address]     CHF (congestive heart failure) ([address]     Diabetes mellitus ([facility] [address]     Hypertension     Renal disorder        Past Surgical History:        Procedure Laterality Date    DIALYSIS FISTULA CREATION         Home Medications:    No current facility-administered medications on file prior to encounter.       Current Outpatient Medications on File Prior to Encounter   Medication Sig Dispense Refill    aspirin 81 MG chewable tablet Take 81 mg by mouth daily      atorvastatin (LIPITOR) 40 MG tablet Take 40 mg by mouth nightly      cilostazol (PLETAL) 50 MG tablet Take 50 mg by mouth 2 times daily      famotidine (PEPCID) 20 MG tablet Take 20 mg by mouth 2 times daily      hydrOXYzine (ATARAX) 25 MG tablet Take 25 mg by mouth daily as needed      meclizine (ANTIVERT) 12.5 MG tablet Take 12.5 mg by mouth every 6 hours as needed      methocarbamol (ROBAXIN) 500 MG tablet Take 500 mg by mouth 3 times daily as needed      midodrine (PROAMATINE) 10 MG tablet Take 10 mg by mouth 3 times daily      naloxone 4 MG/0.1ML LIQD nasal spray 4 mg once      ondansetron (ZOFRAN-ODT) 4 MG disintegrating tablet Take 4 mg by mouth every 4 hours as needed      oxyCODONE-acetaminophen (PERCOCET) 7.5-325 MG per tablet Take 1 tablet by mouth every 6 hours as needed.  Sucroferric Oxyhydroxide (VELPHORO) 500 MG CHEW Take 1 tablet by mouth 3 times daily (with meals)      aspirin EC 81 MG EC tablet Take 100 mg by mouth 2 times daily as needed      docusate sodium (COLACE) 100 MG capsule Take 100 mg by mouth 2 times daily      insulin regular (HUMULIN R;NOVOLIN R) 100 UNIT/ML injection Inject 24 Units into the skin 2 times daily (before meals) 24 units in AM and 26 units evening      ergocalciferol (ERGOCALCIFEROL) 1.25 MG (53807 UT) capsule Take 50,000 Units by mouth once a week      b complex vitamins capsule Take 1 capsule by mouth daily B complex with S-15- folic acid I mg tab      clopidogrel (PLAVIX) 75 MG tablet Take 75 mg by mouth daily      HYDROcodone-acetaminophen (NORCO)  MG per tablet Take 1 tablet by mouth 4 times daily as needed.  lisinopril (PRINIVIL;ZESTRIL) 2.5 MG tablet Take 2.5 mg by mouth daily      pravastatin (PRAVACHOL) 40 MG tablet Take 40 mg by mouth daily         Allergies:  Patient has no known allergies.     Social History:    Social History     Socioeconomic History    Marital status:      Spouse name: Not on file    Number of children: Not on file    Years of education: Not on file    Highest education level: Not on file   Occupational History    Not on file   Social Needs    Financial resource strain: Not on file    Food insecurity     Worry: Not on file     Inability: Not on file    Transportation needs     Medical: Not on file     Non-medical: Not on file   Tobacco Use    Smoking status: Never Smoker    Smokeless tobacco: Former User   Substance and Sexual Activity  Alcohol use: Not on file     Comment: rarely    Drug use: Never    Sexual activity: Not on file   Lifestyle    Physical activity     Days per week: Not on file     Minutes per session: Not on file    Stress: Not on file   Relationships    Social connections     Talks on phone: Not on file     Gets together: Not on file     Attends Congregational service: Not on file     Active member of club or organization: Not on file     Attends meetings of clubs or organizations: Not on file     Relationship status: Not on file    Intimate partner violence     Fear of current or ex partner: Not on file     Emotionally abused: Not on file     Physically abused: Not on file     Forced sexual activity: Not on file   Other Topics Concern    Not on file   Social History Narrative    Not on file       Family History:   History reviewed. No pertinent family history. Review of Systems:   Review of Systems   Constitutional: Negative for activity change. HENT: Negative for congestion. Eyes: Negative for discharge. Respiratory: Negative for apnea. Cardiovascular: Negative for chest pain. Gastrointestinal: Negative for abdominal distention. Endocrine: Negative for cold intolerance. Genitourinary: Negative for difficulty urinating. Musculoskeletal: Positive for arthralgias and back pain. Allergic/Immunologic: Negative for environmental allergies. Neurological: Positive for dizziness. Physical exam:   Constitutional:  VITALS:  BP (!) 78/47   Pulse 55   Temp 97.7 °F (36.5 °C) (Oral)   Resp 18   Ht 5' 9\" (1.753 m)   Wt 290 lb (131.5 kg)   SpO2 (!) 60%   BMI 42.83 kg/m²   Gen: alert, awake, nad  Skin: no rash, turgor wnl  Heent:  eomi, mmm  Neck: no bruits or jvd noted, thyroid normal  Lungs:  Normal expansion. Clear to auscultation. No rales, rhonchi, or wheezing. Heart:  Heart sounds are normal.  Regular rate and rhythm without murmur, gallop or rub.   Abdomen:  +bs, soft, nt, nd, no hepatosplenomegaly   Extremities: Extremities warm to touch, pink, with no edema. Psychiatric: mood and affect appropriate; judgement and insight intact  Musculoskeletal:  Rom, muscular strength intact; digits, nails normal  Left AV fistula with thrill and bruit    Data/  CBC:   Lab Results   Component Value Date    WBC 5.5 10/22/2020    RBC 4.24 10/22/2020    HGB 12.5 10/22/2020    HCT 38.1 10/22/2020    MCV 89.9 10/22/2020    MCH 29.4 10/22/2020    MCHC 32.7 10/22/2020    RDW 14.4 10/22/2020     10/22/2020     BMP:    Lab Results   Component Value Date     10/21/2020    K 3.7 10/21/2020    CL 93 10/21/2020    CO2 26 10/21/2020    BUN 28 10/21/2020    LABALBU 3.9 10/21/2020    CREATININE 7.49 10/21/2020    CALCIUM 9.7 10/21/2020    GFRAA 8.9 10/21/2020    LABGLOM 7.3 10/21/2020    GLUCOSE 196 10/21/2020     Ct Head Wo Contrast    Result Date: 10/21/2020  CT HEAD WO CONTRAST CLINICAL HISTORY:  dizzy, blurred vision, left arm ataxia; dizziness is gone; assess for CVA COMPARISON: [facility]care everywhere CT brain report September 11, 2020 TECHNIQUE: Multiple unenhanced serial axial images of the brain from the vertex of the skull to the base of the skull were performed. FINDINGS: The ventricles are dilated. This is compensatory to the surrounding moderate generalized parenchymal volume loss. No mass. No midline shift. The cisterns are patent. There are white matter and periventricular changes most likely consistent with chronic small vessel disease. Probable old lacunar infarct left and right basal ganglia. Old left posterior parietal temporal area of infarct with encephalomalacia. No acute intra-axial or extra-axial findings. The visualized osseous structures are unremarkable. The visualized portion of the paranasal sinuses, and mastoids are unremarkable. NO ACUTE INTRA-AXIAL OR EXTRA-AXIAL FINDINGS.  IF SIGNS OR SYMPTOMS PERSIST THEN CONSIDER MRI TO FURTHER EVALUATE IF THERE ARE NO CONTRAINDICATIONS All CT scans at this facility use dose modulation, iterative reconstruction, and/or weight based dosing when appropriate to reduce radiation dose to as low as reasonably achievable. Cta Neck W Wo Contrast    CTA HEAD AND CTA NECK No large vessel occlusion or significant stenosis EXAMINATION: CTA NECK W WO CONTRAST CTA HEAD DATE AND TIME:10/21/2020 12:24 PM CLINICAL HISTORY: Stroke  dizzy, left arm ataxia  COMPARISON: None TECHNIQUE:Helical CTA of the head and neck was performed from the vertex to the aortic arch following the uneventful intravenous administration of 100 cc of nonionic contrast without incident. 2-D images were reconstructed in the sagittal and coronal planes. Three Dimensional Maximum Intensity Projection (3D-MIP) images were created. All images were reviewed and primarily archived to PACS workstation. All CT scans at this facility use dose modulation, iterative reconstruction, and/or weight based dosing when appropriate to reduce radiation dose to as low as reasonably achievable. NASCET Criteria were utilized FINDINGS CTA Head     Intracranial ICAs: Normal flow in precavernous, cavernous, clinoid and supraclinoid segments of the internal carotid arteries bilaterally     Anterior cerebral arteries:No occlusion or significant stenosis. Middle cerebral arteries:No large vessel occlusion or significant stenosis. Posterior Circulation: No large vessel occlusion or significant stenosis. Basilar artery:No significant stenosis. Aneurysm No aneurysm or dissection in the anterior or posterior circulations. .     Neurocranium remote left occipital infarct     Dural sinus: Dural venous sinuses are unremarkable. EXAMINATION: CTA NECK W WO CONTRAST.  CTA NECK DATE AND TIME:10/21/2020 12:24 PM CLINICAL HISTORY: Stroke symptoms   dizzy, left arm ataxia  TECHNIQUE: TECHNIQUE:Helical CTA of the head and neck was performed from the vertex to the aortic arch following the uneventful intravenous administration of 100 cc of nonionic contrast without incident. 2-D images were reconstructed in the sagittal and  coronal planes. Three Dimensional Maximum Intensity Projection (3D-MIP) images were created. All images were reviewed and primarily archived to PACS workstation. All CT scans at this facility use dose modulation, iterative reconstruction, and/or weight  based dosing when appropriate to reduce radiation dose to as low as reasonably achievable. NASCET Criteria were utilized Result / Findings: CTA NECK: Aortic Arch: No stenosis aneurysm or dissection. Carotid:   Right  Carotid Stenosis (% by NASCET Criteria):  Less than 50%         Left  Carotid Stenosis (% by NASCET Criteria):  Less than 50%     Cervical Vertebral Arteries:    Patency: No stenosis aneurysm or dissection. Vertebral arteries are codominant. Xr Chest Portable    Result Date: 10/21/2020  EXAMINATION: CHEST PORTABLE VIEW  CLINICAL HISTORY: CODE BAT COMPARISONS: None  FINDINGS: Single  views of the chest is submitted. The cardiac silhouette is of normal size configuration. Pulmonary vascular unremarkable. Right sided trachea. No focal infiltrates. No Pneumothoraces. NO ACUTE ACTIVE CARDIOPULMONARY PROCESS    Mri Brain Without Contrast    Result Date: 10/21/2020  EXAMINATION: MRI BRAIN WO CONTRAST DATE AND TIME:10/21/2020 3:42 PM CLINICAL HISTORY: Headache   tia  COMPARISON: None TECHNIQUE:  Multi-sequence multiplanar imaging of the brain was performed. Sequences included T1-weighted, T2-weighted, FLAIR, diffusion-weighted, ADC maps, and  susceptibility weighted imaging. VOLUME: None   MR CONTRAST: None FINDINGS Acute change: No restricted diffusion to suggest an acute infarct. Magnetic susceptibility:Old left occipital temporal infarct with remote blood products. Ventricles: Ventricles and sulci are age-appropriate.  Brain parenchyma There are white matter hyperintensities likely related to chronic microvascular ischemic change . 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 Mass: No mass or mass effect. No extra-axial fluid                                                  Skull base: Cerebellar tonsils are normally positioned. No evidence of a marrow replacement process. Vasculature: The major intracranial arterial structures and dural venous sinuses showed typical flow void, suggesting patency by spin-echo criteria. Sinuses: Minimal fluid signal intensity in the right mastoid air cells. CHRONIC CHANGES WITH NO ACUTE INFARCT, HEMORRHAGE OR MASS LESION. Assessment/  70-year-old man with end-stage renal disease on hemodialysis Tuesday Thursday Saturday. Presented with dizziness. Had a CT of the neck with contrast.  Has chronically low blood pressure. Also with left hip pain. Neurology has been consulted. Plan/  1-hemodialysis today with a 3K bath. We will try to keep fluid removal somewhat limited to 1.5 to 2 L  2-continue midodrine for low blood pressures  3. On velphoro as a phosphorus binder. If we do not have that here we will substitute Renvela      Thank you for the consultation. Please do not hesitate to call with questions.     Teresa Ortega

## 2020-10-22 NOTE — PROGRESS NOTES
[facility]   Facility/Department: [facility]  Speech Language Pathology    NAME:Jocelin Oliver  : 1955  ROOM: [id number]      DATE: 10/22/2020      This patient is currently in Observation/Outpatient Status. Due to Medicare, other insurance and Hospital Guidelines, a written order with a therapy specific diagnosis (dysphagia, dysarthria, aphasia) must be attached to the order before we can initiate the evaluation. Re-order speech therapy with the appropriate diagnosis, as needed. Thank you,  Jamia Kelley.  Dalia Ann, Date: 10/22/2020, Time: 8:06 AM

## 2020-10-22 NOTE — PROGRESS NOTES
Physical Therapy   Facility/Department: [facility] MED SURG G342/[id number]    NAME: Zachary Gagnon    : 1955 (36 y.o.)  MRN: [id number]    Account: [de-identified]  Gender: male    PT evaluation and treatment orders received. Chart reviewed. PT eval attempted. Patient Unavailable:pt declined this morning citing need to eat breakfast as he is going to dialysis this morning. Will attempt PT evaluation again at earliest convenience.       Electronically signed by Domitila Arredondo PT on 10/22/20 at 10:24 AM EDT

## 2020-10-22 NOTE — DISCHARGE SUMMARY
[facility] [address]    Radiology:   Most recent    Chest CT      WITH CONTRAST:No results found for this or any previous visit. WITHOUT CONTRAST: No results found for this or any previous visit. CXR      2-view: No results found for this or any previous visit. Portable:   Results for orders placed during the hospital encounter of 10/21/20   XR CHEST PORTABLE    Narrative EXAMINATION: CHEST PORTABLE VIEW     CLINICAL HISTORY: CODE BAT    COMPARISONS: None     FINDINGS:    Single  views of the chest is submitted. The cardiac silhouette is of normal size configuration. Pulmonary vascular unremarkable. Right sided trachea. No focal infiltrates. No Pneumothoraces. Impression NO ACUTE ACTIVE CARDIOPULMONARY PROCESS       Echo No results found for this or any previous visit.     Disposition: home    In process/preliminary results:  Outstanding Order Results     Date and Time Order Name Status Description    10/21/2020 12[address] In process           Patient Instructions:   Current Discharge Medication List      CONTINUE these medications which have CHANGED    Details   meclizine (ANTIVERT) 25 MG tablet Take 1 tablet by mouth 3 times daily as needed for Dizziness  Qty: 30 tablet, Refills: 1         CONTINUE these medications which have NOT CHANGED    Details   aspirin 81 MG chewable tablet Take 81 mg by mouth daily      atorvastatin (LIPITOR) 40 MG tablet Take 40 mg by mouth nightly      cilostazol (PLETAL) 50 MG tablet Take 50 mg by mouth 2 times daily      famotidine (PEPCID) 20 MG tablet Take 20 mg by mouth 2 times daily      hydrOXYzine (ATARAX) 25 MG tablet Take 25 mg by mouth daily as needed      methocarbamol (ROBAXIN) 500 MG tablet Take 500 mg by mouth 3 times daily as needed      midodrine (PROAMATINE) 10 MG tablet Take 10 mg by mouth 3 times daily      naloxone 4 MG/0.1ML LIQD nasal spray 4 mg once ondansetron (ZOFRAN-ODT) 4 MG disintegrating tablet Take 4 mg by mouth every 4 hours as needed      Sucroferric Oxyhydroxide (VELPHORO) 500 MG CHEW Take 1 tablet by mouth 3 times daily (with meals)      docusate sodium (COLACE) 100 MG capsule Take 100 mg by mouth 2 times daily      insulin regular (HUMULIN R;NOVOLIN R) 100 UNIT/ML injection Inject 24 Units into the skin 2 times daily (before meals) 24 units in AM and 26 units evening      ergocalciferol (ERGOCALCIFEROL) 1.25 MG (63100 UT) capsule Take 50,000 Units by mouth once a week      b complex vitamins capsule Take 1 capsule by mouth daily B complex with C-14- folic acid I mg tab      clopidogrel (PLAVIX) 75 MG tablet Take 75 mg by mouth daily      lisinopril (PRINIVIL;ZESTRIL) 2.5 MG tablet Take 2.5 mg by mouth daily         STOP taking these medications       HYDROcodone-acetaminophen (NORCO)  MG per tablet Comments:   Reason for Stopping:         oxyCODONE-acetaminophen (PERCOCET) 7.5-325 MG per tablet Comments:   Reason for Stopping:         pravastatin (PRAVACHOL) 40 MG tablet Comments:   Reason for Stopping:         aspirin EC 81 MG EC tablet Comments:   Reason for Stopping:             Activity: activity as tolerated  Diet: diabetic diet  Wound Care: none needed    Follow-up with PCP in 1-2 weeks, nephrology as scheduled, neurology as needed.     DC time 35 minutes    Signed:  Electronically signed by Audrey Andino MD on 10/22/2020 at 11:54 AM

## 2020-10-22 NOTE — CARE COORDINATION
Met with patient at the bedside to discuss transition of care. He will have [employer] transportation pick him up tomorrow to transport back to [place] where he currently lives with his brother. He owns his own home and does not want to give it up for placement but may want to go to a NH for short term sometime in the near future. I suggested if he feels that he needs therapy he should contact his PCP in [place] to request home health care therapy in the home. Patient is willing to consider the option. Plan for discharge 10/23.   Electronically signed by Bertha Gupta RN on 10/22/2020 at 4:58 PM

## 2020-10-22 NOTE — PROGRESS NOTES
Physical Therapy Med Surg Initial Assessment  Facility/Department: [facility]  [facility]       NAME: Soraya Alvarez  : 1955 (71 y.o.)  MRN: [id number]  CODE STATUS: Full Code    Date of Service: 10/22/2020    Patient Diagnosis(es): TIA (transient ischemic attack) [G45.9]   Chief Complaint   Patient presents with    Dizziness     at [facility], worse when moving head     Patient Active Problem List    Diagnosis Date Noted    TIA (transient ischemic attack) 10/21/2020        Past Medical History:   Diagnosis Date    Arthritis     [facility]erebral [facility] occlusion [facility] infarction ([facility] [address]     CHF (congestive heart failure) ([facility] [address]     Diabetes mellitus ([facility] [address]     Hypertension     Renal disorder      Past Surgical History:   Procedure Laterality Date    DIALYSIS FISTULA CREATION         Chart Reviewed: Yes  Patient assessed for rehabilitation services?: Yes  Family / Caregiver Present: No  General Comment  Comments: Pt is resting in bed, agrees to PT eval with encouragement. Pt frustrated with his pain. Restrictions:  Position Activity Restriction  Other position/activity restrictions:  Moderate falls risk     SUBJECTIVE:      Pain  Pre Treatment Pain Screening  Pain at present: 6  Intervention List: Patient able to continue with treatment;Nurse/physician notified    Post Treatment Pain Screening:   Pain Screening  Patient Currently in Pain: Yes  Pain Assessment  Pain Level: 6  Pain Type: Acute pain  Pain Location: Hip(and the thigh)  Pain Orientation: Left    Prior Level of Function:  Social/Functional History  Lives With: Alone  Type of Home: House  Home Layout: One level  Home Access: Level entry  Bathroom Shower/Tub: Walk-in shower  Bathroom Equipment: Shower chair, Grab bars in shower  Home Equipment: Rolling walker, Wheelchair-electric  ADL Assistance: Independent  Homemaking Assistance: Independent  Homemaking Responsibilities: Yes  Ambulation Assistance: Independent(WW)  Transfer Assistance: Independent  Active : No  Additional Comments: brother has been staying past 3 months to assist with ADLs and homemaking    OBJECTIVE:   Vision: Within Functional Limits  Hearing: Within functional limits    Cognition:  Overall Orientation Status: Within Functional Limits  Follows Commands: Within Functional Limits    Observation/Palpation  Posture: Good    ROM:  RLE PROM: WFL  LLE PROM: WFL    Strength:  Strength RLE  Comment: hip 2+/5, quad 2+/5, HS 3+/5, ankle 4/5  Strength LLE  Comment: grossly 4/5 throughout    Neuro:  Balance  Sitting - Static: Good;-  Sitting - Dynamic: Good;-(once dizziness resides, pt able to perform reaching tasks at EOB with feet planted, no LOB)             Bed mobility  Supine to Sit: Moderate assistance  Sit to Supine: Moderate assistance  Scooting: Minimal assistance  Comment: instructed through log roll technique, pt requiring significant assist- once seated EOB, pt has vestibular crisis and requires reassurance and assist to stabilize at EOB as pt insists he is tipping foward. resides about 30 seconds later. same symptoms appear with return to supine    Transfers  Sit to Stand: Unable to assess(declined, states he would be unable to especially with dizziness present with changes in position)    Ambulation  Ambulation?: No              Activity Tolerance  Activity Tolerance: limited by dizziness          PT Education  PT Education: Goals;PT Role;Transfer Training;Plan of Care    ASSESSMENT:   Body structures, Functions, Activity limitations: Decreased functional mobility ; Decreased balance;Decreased coordination;Decreased endurance; Increased pain;Decreased strength;Vestibular Impairment  Decision Making: Medium Complexity  History: high  Exam: high  Clinical Presentation: medium    Prognosis: Good    DISCHARGE RECOMMENDATIONS:  Discharge Recommendations: Patient would benefit from continued therapy after discharge    Assessment: Pt limited by dizziness with changes

## 2020-10-22 NOTE — PROGRESS NOTES
[facility] Neurology Consult Note  Name: José Antonio Hirsch  Age: 71 y.o. Gender: male  CodeStatus: Full Code  Allergies: No Known Allergies    Chief Complaint:Dizziness (at [facility] [place], worse when moving head)    Primary Care Provider: Arpita Manrique MD  InpatientTreatment Team: Treatment Team: Attending Provider: Gabriela Guy MD; Consulting Physician: Ursula Garcia MD; Consulting Physician: Gabriela Guy MD; Consulting Physician: Simeon Martini MD; Patient Care Tech: Mimi Phil; Registered Nurse: Lexi Mathews RN; : Tomy De La Rosa RN  Admission Date: 10/21/2020      HPI   Pt seen and examined for neurology consult. Patient is a 67-year-old male with past medical history of hypertension, diabetes, CHF, CVA on dual antiplatelet therapy prior to admission, stage renal disease on dialysis who presented to [facility] EMERGENCY [facility] AT [place] emergency department on 10/22/2020 with complaints of dizziness that was worse with moving his head. To documentation patient is chronically hypotensive. On midodrine prior to admission. He had dialysis the day of presentation with about 2 L removed. With chronic vertigo since approximately 2014 and has been seen on multiple occasions for dizziness with diplopia. Patient reports that dizziness is worse on dialysis days. Patient reports that dizziness occurs mainly with standing or changing position. No tinnitus, otalgia, hearing loss or changes or ear fullness. No new focal neuro deficits. No chest pain pressure or palpitations. No arrhythmias on telemetry.   Labs/diagnostic testing: CK 99, sodium 134, potassium 3.7, chloride 93, CO2 26, BUN 28, creatinine 7.49, calcium 9.7, alk phos 227, ALT 10, AST 15, CRP 28.6, magnesium 2.2, INR 0 point, troponin 0 0.308, WBC 7.4, hemoglobin 13.4, hematocrit 41.7, platelets 256, hemoglobin A1c 7.5, fasting lipid with total cholesterol 131, triglycerides 132, HDL 30, LDL 75, CT the head with no acute intracranial findings, CTA of the Negative for hearing loss and trouble swallowing. Eyes: Negative for visual disturbance. Respiratory: Negative for cough, chest tightness, shortness of breath and wheezing. Cardiovascular: Negative for chest pain, palpitations and leg swelling. Gastrointestinal: Negative for constipation, nausea and vomiting. Musculoskeletal: Positive for gait problem. Skin: Negative for color change and rash. Neurological: Positive for dizziness. Negative for tremors, seizures, syncope, facial asymmetry, speech difficulty, weakness, light-headedness, numbness and headaches. Psychiatric/Behavioral: Negative for agitation, confusion and hallucinations. The patient is not nervous/anxious. Physical Exam  Vitals signs and nursing note reviewed. Constitutional:       General: He is not in acute distress. Appearance: He is not diaphoretic. HENT:      Head: Normocephalic and atraumatic. Eyes:      General: No visual field deficit. Extraocular Movements: Extraocular movements intact. Pupils: Pupils are equal, round, and reactive to light. Cardiovascular:      Rate and Rhythm: Normal rate and regular rhythm. Pulmonary:      Effort: Pulmonary effort is normal. No respiratory distress. Breath sounds: Normal breath sounds. Skin:     General: Skin is warm and dry. Neurological:      General: No focal deficit present. Mental Status: He is alert and oriented to person, place, and time. Cranial Nerves: No cranial nerve deficit, dysarthria or facial asymmetry. Motor: Weakness (ble) present. No tremor or seizure activity.       Gait: Gait abnormal.               Medications:  Reviewed    Infusion Medications:    dextrose       Scheduled Medications:    midodrine  5 mg Oral TID WC    sevelamer  2,400 mg Oral TID WC    meclizine  25 mg Oral 4 times per day    sodium chloride flush  10 mL Intravenous 2 times per day    heparin (porcine)  5,000 Units Subcutaneous 3 times per day  aspirin  81 mg Oral Daily    Or    aspirin  300 mg Rectal Daily    atorvastatin  80 mg Oral Nightly    ALPRAZolam  0.5 mg Oral Once    polyethylene glycol  17 g Oral Daily    senna  1 tablet Oral Nightly    insulin lispro  0-12 Units Subcutaneous TID WC    insulin lispro  0-6 Units Subcutaneous Nightly     PRN Meds: heparin (porcine), sodium chloride flush, promethazine **OR** ondansetron, glucose, dextrose, glucagon (rDNA), dextrose    Labs:   Recent Labs     10/21/20  1217 10/22/20  0525   WBC 7.4 5.5   HGB 13.4* 12.5*   HCT 41.7* 38.1*    299     Recent Labs     10/21/20  1216   *   K 3.7   CL 93*   CO2 26   BUN 28*   CREATININE 7.49*   CALCIUM 9.7     Recent Labs     10/21/20  1216   AST 15   ALT 10   BILITOT 0.5   ALKPHOS 227*     Recent Labs     10/21/20  1216   INR 1.1     Recent Labs     10/21/20  1216 10/22/20  0525 10/22/20  0856   CKTOTAL 99  --   --    TROPONINI 0.308* 0.274* 0.294*       Urinalysis:   No results found for: Galvan Greulich, BACTERIA, RBCUA, BLOODU, [address], [address]    Radiology:   Most recent    EEG No procedure found. MRI of Brain No results found for this or any previous visit. Results for orders placed during the hospital encounter of 10/21/20   MRI brain without contrast    Narrative EXAMINATION: MRI BRAIN WO CONTRAST    DATE AND TIME:10/21/2020 3:42 PM    CLINICAL HISTORY: Headache   tia      COMPARISON: None    TECHNIQUE:  Multi-sequence multiplanar imaging of the brain was performed. Sequences included T1-weighted, T2-weighted, FLAIR, diffusion-weighted, ADC maps, and  susceptibility weighted imaging. VOLUME: None   MR CONTRAST: None     FINDINGS    Acute change: No restricted diffusion to suggest an acute infarct. Magnetic susceptibility:Old left occipital temporal infarct with remote blood products. Ventricles: Ventricles and sulci are age-appropriate.      Brain parenchyma There are white matter hyperintensities likely related to chronic

## 2020-10-22 NOTE — PLAN OF CARE
Problem: Falls - Risk of:  Goal: Will remain free from falls  Description: Will remain free from falls  Outcome: Met This Shift  Goal: Absence of physical injury  Description: Absence of physical injury  Outcome: Met This Shift     Problem: Skin Integrity:  Goal: Will show no infection signs and symptoms  Description: Will show no infection signs and symptoms  Outcome: Ongoing  Goal: Absence of new skin breakdown  Description: Absence of new skin breakdown  Outcome: Ongoing

## 2020-10-22 NOTE — PROGRESS NOTES
HEMODIALYSIS POST TREATMENT NOTE    Treatment time ordered: 4hr    Actual treatment time: 4hr    UltraFiltration Goal: 1.5-2L  UltraFiltration Removed: 2L      Pre Treatment weight: 131.5kg  Post Treatment weight: 129.5kg  Estimated Dry Weight: unknown    Access used:     Central Venous Catheter: n/a     Internal Access: LUE AVF        Access Flow: Access function WNL. 15g needles used x2 with no complication. Positive bruit and thrill pre and post treatment     Sign and symptoms of infection: No       If YES: n/a    Medications or blood products given: n/a    Regular outpatient schedule: TTS    Summary of response to treatment:  Vitals signs remained stable throughout treatment. Net UF= 2L. Blood returned post treatment, needles pulled x2 and sites held until hemostasis achieved. Post tx VSS. Explain if orders NOT met, was physician notified:n/a    Post assessment completed: Yes. Pt is alert and vss. Report given to: Meseret Joseph documented Safety Checks include the followin) Access and face visible at all times. 2) All connections and blood lines are secure with no kinks. 3) NVL alarm engaged. 4) Hemosafe device applied (if applicable). 5) No collapse of Arterial or Venous blood chambers. 6) All blood lines / pump segments in the air detectors.

## 2020-10-22 NOTE — FLOWSHEET NOTE
Pt. A&Ox4. Pt. Had no c/o pain. Pt. Does have BUE weakness especially on the right from previous CVA. Pt. Stated that the dizziness & blurred vision has resolved. Pt. Reported constipation & rec'd Miralax. Pt. Resting in bed. Med list complete.

## 2020-10-23 VITALS
BODY MASS INDEX: 42.29 KG/M2 | WEIGHT: 285.5 LBS | HEART RATE: 84 BPM | TEMPERATURE: 97.7 F | RESPIRATION RATE: 17 BRPM | DIASTOLIC BLOOD PRESSURE: 63 MMHG | HEIGHT: 69 IN | OXYGEN SATURATION: 95 % | SYSTOLIC BLOOD PRESSURE: 88 MMHG

## 2020-10-23 PROBLEM — R42 VERTIGO: Status: ACTIVE | Noted: 2020-10-23

## 2020-10-23 LAB
GLUCOSE BLD-MCNC: 259 MG/DL (ref 60–115)
GLUCOSE BLD-MCNC: 273 MG/DL (ref 60–115)
PERFORMED ON: ABNORMAL
PERFORMED ON: ABNORMAL

## 2020-10-23 PROCEDURE — 6370000000 HC RX 637 (ALT 250 FOR IP): Performed by: FAMILY MEDICINE

## 2020-10-23 PROCEDURE — 2580000003 HC RX 258: Performed by: FAMILY MEDICINE

## 2020-10-23 PROCEDURE — 6360000002 HC RX W HCPCS: Performed by: FAMILY MEDICINE

## 2020-10-23 PROCEDURE — 6370000000 HC RX 637 (ALT 250 FOR IP): Performed by: NURSE PRACTITIONER

## 2020-10-23 PROCEDURE — 97166 OT EVAL MOD COMPLEX 45 MIN: CPT

## 2020-10-23 PROCEDURE — 6370000000 HC RX 637 (ALT 250 FOR IP): Performed by: INTERNAL MEDICINE

## 2020-10-23 PROCEDURE — 1210000000 HC MED SURG R&B

## 2020-10-23 PROCEDURE — 99233 SBSQ HOSP IP/OBS HIGH 50: CPT | Performed by: PSYCHIATRY & NEUROLOGY

## 2020-10-23 PROCEDURE — 96372 THER/PROPH/DIAG INJ SC/IM: CPT

## 2020-10-23 PROCEDURE — APPSS15 APP SPLIT SHARED TIME 0-15 MINUTES: Performed by: NURSE PRACTITIONER

## 2020-10-23 RX ADMIN — MIDODRINE HYDROCHLORIDE 10 MG: 5 TABLET ORAL at 10:04

## 2020-10-23 RX ADMIN — HEPARIN SODIUM 5000 UNITS: 5000 INJECTION INTRAVENOUS; SUBCUTANEOUS at 14:20

## 2020-10-23 RX ADMIN — ASPIRIN 81 MG: 81 TABLET, COATED ORAL at 10:05

## 2020-10-23 RX ADMIN — HYDROMORPHONE HYDROCHLORIDE 0.5 MG: 1 INJECTION, SOLUTION INTRAMUSCULAR; INTRAVENOUS; SUBCUTANEOUS at 16:42

## 2020-10-23 RX ADMIN — MECLIZINE HYDROCHLORIDE 25 MG: 25 TABLET ORAL at 00:06

## 2020-10-23 RX ADMIN — MIDODRINE HYDROCHLORIDE 10 MG: 5 TABLET ORAL at 14:19

## 2020-10-23 RX ADMIN — SEVELAMER CARBONATE 2400 MG: 800 TABLET, FILM COATED ORAL at 10:05

## 2020-10-23 RX ADMIN — MECLIZINE HYDROCHLORIDE 25 MG: 25 TABLET ORAL at 13:02

## 2020-10-23 RX ADMIN — Medication 10 ML: at 10:05

## 2020-10-23 RX ADMIN — POLYETHYLENE GLYCOL 3350 17 G: 17 POWDER, FOR SOLUTION ORAL at 10:04

## 2020-10-23 RX ADMIN — SEVELAMER CARBONATE 2400 MG: 800 TABLET, FILM COATED ORAL at 14:20

## 2020-10-23 ASSESSMENT — ENCOUNTER SYMPTOMS
CHEST TIGHTNESS: 0
WHEEZING: 0
COLOR CHANGE: 0
VOMITING: 0
SHORTNESS OF BREATH: 0
NAUSEA: 0
TROUBLE SWALLOWING: 0
COUGH: 0

## 2020-10-23 ASSESSMENT — PAIN SCALES - GENERAL: PAINLEVEL_OUTOF10: 6

## 2020-10-23 NOTE — PROGRESS NOTES
[facility] Occupational Therapy      Date: 10/23/2020  Patient Name: Jorge Alberto Coronel        MRN: [id number]  Account: [de-identified]   : 1955  (71 y.o.)  Room: [place] [address]    Chart reviewed, attempted OT at [phone] for eval. Patient not seen 2° to:    Pt. declined, stating: \"I just had such a terrible night, I'm so tired and I really need to sleep. \" Amenable to attempting later this date. Spoke to FRANSISCA/AnitraConaomi RN aware. Will attempt again when able.     Electronically signed by DONAVON Hussein on 10/23/2020 at 9:56 AM

## 2020-10-23 NOTE — PROGRESS NOTES
Hospitalist Daily Progress Note  Name: Christopher De Guzman  Age: 71 y.o. Gender: male  CodeStatus: Full Code  Allergies: No Known Allergies    Chief Complaint:Dizziness (at [facility] [place], worse when moving head)      Primary Care Provider: Kary Mitchell MD    InpatientTreatment Team: Treatment Team: Attending Provider: Merrick Levi MD; Consulting Physician: iSmon Esposito MD; Consulting Physician: Lizeth Murphy MD; Nursing Student: Todd Moore; Utilization Reviewer: Marybel Landry RN; : Bertha Gupta, UYEN; Registered Nurse: Rodolfo Jacome RN; Patient Care Tech: Steve Good; Patient Care Tech: Arlene Hall    Admission Date: 10/21/2020      Subjective: No chest pain, sob, nausea.   Dizziness with change in position    General appearance: alert, appears stated age and cooperative  Lungs: clear to auscultation bilaterally  Heart: regular rate and rhythm, S1, S2 normal, no murmur, click, rub or gallop  Abdomen: soft, non-tender; bowel sounds normal; no masses,  no organomegaly  Extremities: extremities normal, atraumatic, no cyanosis or edema  Skin: Skin color, texture, turgor normal. No rashes or lesions    Medications:  Reviewed    Infusion Medications:    dextrose       Scheduled Medications:    HYDROmorphone  0.5 mg Intravenous Once    sevelamer  2,400 mg Oral TID WC    meclizine  25 mg Oral 4 times per day    midodrine  10 mg Oral TID WC    sodium chloride flush  10 mL Intravenous 2 times per day    heparin (porcine)  5,000 Units Subcutaneous 3 times per day    aspirin  81 mg Oral Daily    Or    aspirin  300 mg Rectal Daily    atorvastatin  80 mg Oral Nightly    ALPRAZolam  0.5 mg Oral Once    polyethylene glycol  17 g Oral Daily    senna  1 tablet Oral Nightly    insulin lispro  0-12 Units Subcutaneous TID WC    insulin lispro  0-6 Units Subcutaneous Nightly     PRN Meds: heparin (porcine), bisacodyl, sodium chloride flush, promethazine **OR** ondansetron, glucose, dextrose, glucagon (rDNA), dextrose    Labs:   Recent Labs     10/21/20  1217 10/22/20  0525   WBC 7.4 5.5   HGB 13.4* 12.5*   HCT 41.7* 38.1*    299     Recent Labs     10/21/20  1216   *   K 3.7   CL 93*   CO2 26   BUN 28*   CREATININE 7.49*   CALCIUM 9.7     Recent Labs     10/21/20  1216   AST 15   ALT 10   BILITOT 0.5   ALKPHOS 227*     Recent Labs     10/21/20  1216   INR 1.1     Recent Labs     10/21/20  1216 10/22/20  0525 10/22/20  0856   CKTOTAL 99  --   --    TROPONINI 0.308* 0.274* 0.294*       Urinalysis:   No results found for: Geoffrey Rocker, BACTERIA, RBCUA, BLOODU, SPECGRAV, [address]    Radiology:   Most recent    Chest CT      WITH CONTRAST:No results found for this or any previous visit. WITHOUT CONTRAST: No results found for this or any previous visit. CXR      2-view: No results found for this or any previous visit. Portable:   Results for orders placed during the hospital encounter of 10/21/20   XR CHEST PORTABLE    Narrative EXAMINATION: CHEST PORTABLE VIEW     CLINICAL HISTORY: CODE BAT    COMPARISONS: None     FINDINGS:    Single  views of the chest is submitted. The cardiac silhouette is of normal size configuration. Pulmonary vascular unremarkable. Right sided trachea. No focal infiltrates. No Pneumothoraces. Impression NO ACUTE ACTIVE CARDIOPULMONARY PROCESS       Echo No results found for this or any previous visit. Assessment/Plan:    Active Hospital Problems    Diagnosis Date Noted    Vertigo [R42] 10/23/2020    Ataxia of left upper extremity [R27.0]     TIA (transient ischemic attack) [G45.9] 10/21/2020     Neuro- patient admitted with concern for TIA, however symptoms were not focal and patient reported dizziness that is intermittently chronic. He was re-started on meclizine which he has previously taken for vertigo symptoms. MRI brain was negative.   Cont ASA for cva prevention.     10/23 - continues to have vertigo, plan to dc home with meclizine, may be due to low bp, cont midodrine, vestibular rehab per neuro    Electronically signed by Dina Acosta MD on 10/23/2020 at 3:27 PM

## 2020-10-23 NOTE — PROGRESS NOTES
[facility] Neurology Daily Progress Note  Name: Christopher De Guzman  Age: 71 y.o. Gender: male  CodeStatus: Full Code  Allergies: No Known Allergies    Chief Complaint:Dizziness (at [facility] [place], worse when moving head)    Primary Care Provider: Kary Mitchell MD  InpatientTreatment Team: Treatment Team: Attending Provider: Merrick Levi MD; Consulting Physician: Simon Esposito MD; Consulting Physician: Lizeth Murphy MD; Patient Care Tech: Steve Andrewserge; Nursing Student: Todd Moore; Utilization Reviewer: Marybel Landry, RN; : Bertha Gupta, RN; Registered Nurse: Rodolfo Jacome RN  Admission Date: 10/21/2020      HPI   Pt seen and examined for neuro follow up for positional vertigo in the setting of hypotension. Currently alert and oriented x3, no acute distress, cooperative. Discharge was held up last evening due to patient with episode of vertigo. He states his episodes only last for 3 to 4 seconds at a time. He has had chronic vertigo for many years. He has had extensive work-up for this. No focal deficits. No seizure activity. No tinnitus, otalgia, ear pain. No arrhythmias noted. Vitals:    10/23/20 1000   BP: (!) 107/44   Pulse:    Resp:    Temp:    SpO2:       Review of Systems   Constitutional: Positive for fatigue. Negative for appetite change, chills and fever. HENT: Negative for hearing loss and trouble swallowing. Eyes: Negative for visual disturbance. Respiratory: Negative for cough, chest tightness, shortness of breath and wheezing. Cardiovascular: Negative for chest pain, palpitations and leg swelling. Gastrointestinal: Negative for nausea and vomiting. Musculoskeletal: Positive for gait problem. Skin: Negative for color change and rash. Neurological: Positive for dizziness. Negative for tremors, seizures, syncope, facial asymmetry, speech difficulty, weakness, light-headedness, numbness and headaches.    Psychiatric/Behavioral: Negative for agitation, confusion and hallucinations. The patient is not nervous/anxious. Physical Exam  Vitals signs and nursing note reviewed. Constitutional:       General: He is not in acute distress. Appearance: He is obese. He is not diaphoretic. HENT:      Head: Normocephalic and atraumatic. Eyes:      Extraocular Movements: Extraocular movements intact. Pupils: Pupils are equal, round, and reactive to light. Cardiovascular:      Rate and Rhythm: Normal rate and regular rhythm. Pulmonary:      Effort: Pulmonary effort is normal. No respiratory distress. Breath sounds: Normal breath sounds. Skin:     General: Skin is warm and dry. Neurological:      General: No focal deficit present. Mental Status: He is alert and oriented to person, place, and time. Cranial Nerves: No cranial nerve deficit. Motor: No tremor or seizure activity.                Medications:  Reviewed    Infusion Medications:    dextrose       Scheduled Medications:    sevelamer  2,400 mg Oral TID WC    meclizine  25 mg Oral 4 times per day    midodrine  10 mg Oral TID WC    sodium chloride flush  10 mL Intravenous 2 times per day    heparin (porcine)  5,000 Units Subcutaneous 3 times per day    aspirin  81 mg Oral Daily    Or    aspirin  300 mg Rectal Daily    atorvastatin  80 mg Oral Nightly    ALPRAZolam  0.5 mg Oral Once    polyethylene glycol  17 g Oral Daily    senna  1 tablet Oral Nightly    insulin lispro  0-12 Units Subcutaneous TID WC    insulin lispro  0-6 Units Subcutaneous Nightly     PRN Meds: heparin (porcine), bisacodyl, sodium chloride flush, promethazine **OR** ondansetron, glucose, dextrose, glucagon (rDNA), dextrose    Labs:   Recent Labs     10/21/20  1217 10/22/20  0525   WBC 7.4 5.5   HGB 13.4* 12.5*   HCT 41.7* 38.1*    299     Recent Labs     10/21/20  1216   *   K 3.7   CL 93*   CO2 26   BUN 28*   CREATININE 7.49*   CALCIUM 9.7     Recent Labs     10/21/20  1216   AST 15 ALT 10   BILITOT 0.5   ALKPHOS 227*     Recent Labs     10/21/20  1216   INR 1.1     Recent Labs     10/21/20  1216 10/22/20  0525 10/22/20  0856   CKTOTAL 99  --   --    TROPONINI 0.308* 0.274* 0.294*       Urinalysis:   No results found for: Dellie Jody, BACTERIA, RBCUA, BLOODU, SPECGRAV, [address]    Radiology:   Most recent    EEG No procedure found. MRI of Brain No results found for this or any previous visit. Results for orders placed during the hospital encounter of 10/21/20   MRI brain without contrast    Narrative EXAMINATION: MRI BRAIN WO CONTRAST    DATE AND TIME:10/21/2020 3:42 PM    CLINICAL HISTORY: Headache   tia      COMPARISON: None    TECHNIQUE:  Multi-sequence multiplanar imaging of the brain was performed. Sequences included T1-weighted, T2-weighted, FLAIR, diffusion-weighted, ADC maps, and  susceptibility weighted imaging. VOLUME: None   MR CONTRAST: None     FINDINGS    Acute change: No restricted diffusion to suggest an acute infarct. Magnetic susceptibility:Old left occipital temporal infarct with remote blood products. Ventricles: Ventricles and sulci are age-appropriate. Brain parenchyma There are white matter hyperintensities likely related to chronic microvascular ischemic change . 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 Mass: No mass or mass effect. No extra-axial fluid                                                      Skull base: Cerebellar tonsils are normally positioned. No evidence of a marrow replacement process. Vasculature: The major intracranial arterial structures and dural venous sinuses showed typical flow void, suggesting patency by spin-echo criteria. Sinuses: Minimal fluid signal intensity in the right mastoid air cells. Impression CHRONIC CHANGES WITH NO ACUTE INFARCT, HEMORRHAGE OR MASS LESION. MRA of the Head and Neck: No results found for this or any previous visit. No results found for this or any previous visit. No results found for this or any previous visit. CT of the Head:   Results for orders placed during the hospital encounter of 10/21/20   CT HEAD WO CONTRAST    Narrative CT HEAD WO CONTRAST    CLINICAL HISTORY:  dizzy, blurred vision, left arm ataxia; dizziness is gone; assess for CVA     COMPARISON: [facility]/care everywhere CT brain report September 11, 2020    TECHNIQUE: Multiple unenhanced serial axial images of the brain from the vertex of the skull to the base of the skull were performed. FINDINGS: The ventricles are dilated. This is compensatory to the surrounding moderate generalized parenchymal volume loss. No mass. No midline shift. The cisterns are patent. There are white matter and periventricular changes most likely consistent   with chronic small vessel disease. Probable old lacunar infarct left and right basal ganglia. Old left posterior parietal temporal area of infarct with encephalomalacia. No acute intra-axial or extra-axial findings. The visualized osseous structures are unremarkable. The visualized portion of the paranasal sinuses, and mastoids are unremarkable. Impression NO ACUTE INTRA-AXIAL OR EXTRA-AXIAL FINDINGS. IF SIGNS OR SYMPTOMS PERSIST THEN CONSIDER MRI TO FURTHER EVALUATE IF THERE ARE NO CONTRAINDICATIONS    All CT scans at this facility use dose modulation, iterative reconstruction, and/or weight based dosing when appropriate to reduce radiation dose to as low as reasonably achievable. No results found for this or any previous visit. No results found for this or any previous visit. Carotid duplex: No results found for this or any previous visit. No results found for this or any previous visit. No results found for this or any previous visit.     Echo No results found for this or any previous visit. Assessment/Plan:  10/22/20:  Acute on chronic vertigo  End stage renal disease on dialysis  Chronic hypotension on midorine, will increase to 10 mg 3 times daily  MRI of the brain no acute findings  CTA of the head and the neck negative for significant stenosis, occlusion or aneurysm. Vertigo likely secondary to hypotension component of BPV  Ok to DC from neuro standpoint    10/23/20:  Continue meclizine  May benefit from vestibular rehab  Okay to DC from neuro standpoint  Follow-up with established provider  I independently performed an evaluation on this patient. I have reviewed the above documentation completed by the Nurse Practitioner. Please see my additional contributions to the HPI, physical exam, assessment/medical decision making. Bradley Huerta MD, [id number] American Board of Psychiatry & Neurology  Board Certified in Vascular Neurology  Board Certified in Neuromuscular Medicine  Certified in Neurorehabilitation       Collaborating physicians: Dr Amparo Huerta    Electronically signed by RANDA Ac CNP on 10/23/2020 at 11:12 AM

## 2020-10-23 NOTE — PROGRESS NOTES
This nurse had perfect served MD earlier in shift for patient who presented with hypoactive bowel sounds, tenderness to abdomen, and discomfort. MD order suppository which was administered but ineffective. Patient continued to have the above symptoms so NP was perfect served again. NP ordered soaps suds enema. Patient tolerated well and had results. Patient stated that he finally felt relief.

## 2020-10-23 NOTE — PROGRESS NOTES
DC held as patient is very dizzy with movement. Restarted meclizine, d/w patient that hip pain likely needs extensive outpatient follow up but is not a part of his inpatient evaluation for tia. Probable dc home 10/23.

## 2020-10-23 NOTE — PROGRESS NOTES
Renal Progress Note    Assessment and Plan:    58-year-old man with end-stage renal disease on hemodialysis Tuesday Thursday Saturday. Presented with dizziness. Had a CT of the neck with contrast.  Has chronically low blood pressure. Also with left hip pain. Neurology has been consulted.     Plan/  1 ok for d/c from renal standpoint with f/u with regular dialysis unit  2. Cont midodrine        Thank you for the consultation. Please do not hesitate to call with questions. Patient Active Problem List:     TIA (transient ischemic attack)     Ataxia of left upper extremity     Vertigo      Subjective:   Admit Date: 10/21/2020    Interval History: pt doing well. Plan for d/c today. No cp. No sob. Medications:   Scheduled Meds:   sevelamer  2,400 mg Oral TID WC    meclizine  25 mg Oral 4 times per day    midodrine  10 mg Oral TID WC    sodium chloride flush  10 mL Intravenous 2 times per day    heparin (porcine)  5,000 Units Subcutaneous 3 times per day    aspirin  81 mg Oral Daily    Or    aspirin  300 mg Rectal Daily    atorvastatin  80 mg Oral Nightly    ALPRAZolam  0.5 mg Oral Once    polyethylene glycol  17 g Oral Daily    senna  1 tablet Oral Nightly    insulin lispro  0-12 Units Subcutaneous TID WC    insulin lispro  0-6 Units Subcutaneous Nightly     Continuous Infusions:   dextrose         CBC:   Recent Labs     10/21/20  1217 10/22/20  0525   WBC 7.4 5.5   HGB 13.4* 12.5*    299     CMP:    Recent Labs     10/21/20  1141 10/21/20  1216   NA  --  134*   K  --  3.7   CL  --  93*   CO2  --  26   BUN  --  28*   CREATININE  --  7.49*   GLUCOSE 171 196*   CALCIUM  --  9.7   LABGLOM  --  7.3*     Troponin:   Recent Labs     10/22/20  0856   TROPONINI 0.294*     BNP: No results for input(s): BNP in the last 72 hours.   INR:   Recent Labs     10/21/20  1216   INR 1.1     Lipids:   Recent Labs     10/22/20  0525   CHOL 131   TRIG 132   HDL 30*     Liver:   Recent Labs 10/21/20  1216   AST 15   ALT 10   ALKPHOS 227*   PROT 7.8   LABALBU 3.9   BILITOT 0.5     Iron:  No results for input(s): IRONS, FERRITIN in the last 72 hours. Invalid input(s): LABIRONS  Urinalysis: No results for input(s): UA in the last 72 hours. Objective:   Vitals: BP (!) 107/44   Pulse 84   Temp 97.7 °F (36.5 °C) (Oral)   Resp 17   Ht 5' 9\" (1.753 m)   Wt 285 lb 7.9 oz (129.5 kg)   SpO2 95%   BMI 42.16 kg/m²    Wt Readings from Last 3 Encounters:   10/22/20 285 lb 7.9 oz (129.5 kg)      24HR INTAKE/OUTPUT:      Intake/Output Summary (Last 24 hours) at 10/23/2020 1253  Last data filed at 10/22/2020 1418  Gross per 24 hour   Intake 600 ml   Output 2600 ml   Net -2000 ml       Constitutional:  Alert, awake, no apparent distress   Skin:normal, no rash  HEENT:sclera anicteric.   Head atraumatic normocephalic  Neck:supple with no thyromegally  Cardiovascular:  S1, S2 without m/r/g   Respiratory:  CTA B without w/r/r   Abdomen: +bs, soft, nt  Ext: no LE edema  Musculoskeletal:Intact  Neuro:Alert and oriented with no deficit      Electronically signed by Nae Glover MD on 10/23/2020 at 12:53 PM

## 2020-10-23 NOTE — PROGRESS NOTES
[facility] OCCUPATIONAL THERAPY EVALUATION - ACUTE     NAME: Peter Francois  : 1955 (71 y.o.)  MRN: [id number]  CODE STATUS: Full Code  Room: [place] [id number]    Date of Service: 10/23/2020    Patient Diagnosis(es): TIA (transient ischemic attack) [G45.9]  Vertigo [R42]   Chief Complaint   Patient presents with    Dizziness     at [facility], worse when moving head     Patient Active Problem List    Diagnosis Date Noted    Vertigo 10/23/2020    Ataxia of left upper extremity     TIA (transient ischemic attack) 10/21/2020        Past Medical History:   Diagnosis Date    Arthritis     [facility]     CHF [facility]congestive heart failure) [facility]     Diabetes mellitus ([facility]     Hypertension     Renal disorder      Past Surgical History:   Procedure Laterality Date    DIALYSIS FISTULA CREATION          Restrictions:Fall, left AV fistula        Safety Devices: Safety Devices  Safety Devices in place: Yes  Type of devices: All fall risk precautions in place   Initially in place: No    Subjective:\"My brother is staying with me until I get better. He helps me when I need it. \"       Pain Reassessment: 4/10 pain reported in back. Pt medicated prior to onset of session. Prior Level of Function:  Social/Functional History  Lives With: Alone  Type of Home: House  Home Layout: One level  Home Access: Level entry  Bathroom Shower/Tub: Walk-in shower  Bathroom Equipment: Shower chair, Grab bars in [address]: Rolling walker, Wheelchair-electric  ADL Assistance: Independent  Homemaking Assistance: Independent  Homemaking Responsibilities: Yes  Ambulation Assistance: Independent(WW)  Transfer Assistance: Independent  Active : No  Additional Comments: brother has been staying past 3 months to assist with ADLs and homemaking.   Pt reports that previously he had hired assist for homemaking, but managed all other home IADL    OBJECTIVE:     Orientation Status:  Orientation  Overall Orientation Status: Within Functional Limits    Observation:  Observation/Palpation  Posture: Fair  Observation: Pt upon achieving sitting position unable to maintain secondary to vertigo. Pt grabbing head and required to lay down    Cognition Status:  Cognition  Cognition Comment: Pt following one step commands consistently    Perception Status:  Perception  Overall Perceptual Status: WFL    Sensation Status:  Sensation  Overall Sensation Status: (Pt reports neuropathy in bilateral LE's off and on)    Vision and Hearing Status:  Vision  Vision: Within Functional Limits  Hearing  Hearing: Within functional limits     ROM:   LUE AROM (degrees)  LUE AROM : WFL  Left Hand AROM (degrees)  Left Hand AROM: WFL  RUE AROM (degrees)  RUE AROM : WFL  Right Hand AROM (degrees)  Right Hand AROM: WFL    Strength:  LUE Strength  Gross LUE Strength: WFL  L Hand General: 4-/5  LUE Strength Comment: 3+-4-/5  RUE Strength  Gross RUE Strength: WFL  R Hand General: 4-/5  RUE Strength Comment: 3+-4-/5    Coordination, Tone, Quality of Movement: Tone RUE  RUE Tone: Normotonic  Tone LUE  LUE Tone: Normotonic  Coordination  Movements Are Fluid And Coordinated: No  Coordination and Movement description: Decreased speed, Right UE, Left UE    Hand Dominance:  Hand Dominance  Hand Dominance: Right    ADL Status:  ADL  Feeding: Setup  Grooming: Stand by assistance  UE Bathing: Minimal assistance  LE Bathing: Maximum assistance  UE Dressing:  Moderate assistance  LE Dressing: Dependent/Total  Toileting: Unable to assess(comment)  Additional Comments: simulated          Therapy key for assistance levels -   Independent = Pt. is able to perform task with no assistance but may require a device   Stand by assistance = Pt. does not perform task at an independent level but does not need physical assistance, requires verbal cues  Minimal, Moderate, Maximal Assistance = Pt. requires physical assistance (25%, 50%, 75% assist from helper) for task but is able to actively participate in task   Dependent = Pt. requires total assistance with task and is not able to actively participate with task completion     Functional Mobility:          Bed Mobility  Bed mobility  Supine to Sit: Moderate assistance  Sit to Supine: Moderate assistance    Seated and Standing Balance:  Balance  Sitting Balance:  Moderate assistance  Standing Balance: Unable to assess(comment)    Functional Endurance:  Activity Tolerance  Activity Tolerance: Treatment limited secondary to medical complications (free text)  Activity Tolerance: limited by vertigo    D/C Recommendations:  OT D/C RECOMMENDATIONS  REQUIRES OT FOLLOW UP: Yes    Equipment Recommendations:       OT Education:        OT Follow Up:  OT D/C RECOMMENDATIONS  REQUIRES OT FOLLOW UP: Yes       Assessment/Discharge Disposition:     Performance deficits / Impairments: Decreased functional mobility , Decreased balance, Decreased ADL status, Decreased endurance, Decreased strength, Decreased high-level IADLs  Prognosis: Fair  Decision Making: Medium Complexity  History: 6 complexities  Exam: 6 deficits  Assistance / Modification: Max A    Six Click Score    How much help for putting on and taking off regular lower body clothing?: Total  How much help for Bathing?: A Lot  How much help for Toileting?: A Lot  How much help for putting on and taking off regular upper body clothing?: A Little  How much help for taking care of personal grooming?: None  How much help for eating meals?: None  AM-[facility] Inpatient Daily Activity Raw Score: 16  AM-[facility] Inpatient ADL T-Scale Score : 35.96  ADL Inpatient CMS 0-100% Score: 53.32    Plan:  Plan  Times per week: 1-4x/wk  Current Treatment Recommendations: Strengthening, Endurance Training, Neuromuscular Re-education, Self-Care / ADL, Balance Training, Functional Mobility Training, Safety Education & Training    Goals:   Patient will:    - Improve functional endurance to tolerate/complete 12-18 mins of ADL's  - Be Min A in UB ADLs   - Be Mod A in LB ADLs  - Be Min A in ADL transfers without LOB  - Be MIn A in toileting tasks  - Improve bilateral UE strength and endurance to Fair+ in order to participate in self-care activities as projected. - Access appropriate D/C site with as few architectural barriers as possible. Patient Goal: Patient goals :  To return to home with brother's assistance      Discussed and agreed upon: Yes Comments:     Therapy Time:   OT Individual Minutes  Time In: 1305  Time Out: 1325  Minutes: 20    Eval: 20 minutes     Electronically signed by:    OMAR Chaparro/ESPERANZA  49/50/9312, 1:38 PM Electronically signed by DONAVON Chaparro on 46/33/90 at 1:37 PM EDT

## 2020-10-23 NOTE — DISCHARGE INSTR - COC
Continuity of Care Form    Patient Name: Esther Davila   :  1955  MRN:  [id number]    Admit date:  10/21/2020  Discharge date:  10-23-20    Code Status Order: Full Code   Advance Directives:     Admitting Physician:  Nabila Narvaez MD  PCP: Sabi Johnson MD    Discharging Nurse: [facility] Unit/Room#: X543/U538-67  Discharging Unit Phone Number: [phone]    Emergency Contact:   Extended Emergency Contact Information  Primary Emergency Contact: [address] Phone: [phone]  Relation: Brother/Sister  Preferred language: English   needed? No    Past Surgical History:  Past Surgical History:   Procedure Laterality Date    DIALYSIS FISTULA CREATION         Immunization History: There is no immunization history on file for this patient. Active Problems:  Patient Active Problem List   Diagnosis Code    TIA (transient ischemic attack) G45.9    Ataxia of left upper extremity R27.0    Vertigo R42       Isolation/Infection:   Isolation          No Isolation        Patient Infection Status     None to display          Nurse Assessment:  Last Vital Signs: BP (!) 107/44   Pulse 84   Temp 97.7 °F (36.5 °C) (Oral)   Resp 17   Ht 5' 9\" (1.753 m)   Wt 285 lb 7.9 oz (129.5 kg)   SpO2 95%   BMI 42.16 kg/m²     Last documented pain score (0-10 scale): Pain Level: 6  Last Weight:   Wt Readings from Last 1 Encounters:   10/22/20 285 lb 7.9 oz (129.5 kg)     Mental Status:  oriented, alert, thought processes intact and able to concentrate and follow conversation    IV Access:  - None    Nursing Mobility/ADLs:  Walking   Dependent  Transfer  Dependent  Bathing  Assisted  Dressing  Assisted  Toileting  Assisted  Feeding  [facility] Delivery   whole    Wound Care Documentation and Therapy:        Elimination:  Continence:   · Bowel:  Yes  · Bladder: Yes  Urinary Catheter: None   Colostomy/Ileostomy/Ileal Conduit: No       Date of Last BM: 10-23-20  No intake or output data in the 24 hours ending 10/23/20 1601  No intake/output data recorded. Safety Concerns: At Risk for Falls    Impairments/Disabilities:      Difficulty with ambulation    Nutrition Therapy:  Current Nutrition Therapy:   - Oral Diet:  Carb Control 5 carbs/meal (2000kcals/day)    Routes of Feeding: Oral  Liquids: Thin Liquids  Daily Fluid Restriction: no  Last Modified Barium Swallow with Video (Video Swallowing Test): not done    Treatments at the Time of Hospital Discharge:   Respiratory Treatments: Not Available  Oxygen Therapy:  is not on home oxygen therapy. Ventilator:    - No ventilator support    Rehab Therapies: N/A  Weight Bearing Status/Restrictions: No weight bearing restirctions  Other Medical Equipment (for information only, NOT a DME order):  wheelchair and walker  Other Treatments: Hemodialysis Tue-Thur-Sat    Patient's personal belongings (please select all that are sent with patient): Bag of Belongings    RN SIGNATURE:  Electronically signed by Anmol Dewey RN on 10/23/20 at 4:04 PM EDT    CASE MANAGEMENT/SOCIAL WORK SECTION    Inpatient Status Date: 10-23-20    Readmission Risk Assessment Score:  Readmission Risk              Risk of Unplanned Readmission:        14           Discharging to Facility/ Agency   · Name:   · Address:  · Phone:  · Fax:    Dialysis Facility (if applicable)   · Name:  · Address:  · Dialysis Schedule:  · Phone:  · Fax:    / signature: {Esignature:[id number]}    PHYSICIAN SECTION    Prognosis: Good    Condition at Discharge: Stable    Rehab Potential (if transferring to Rehab): Good    Recommended Labs or Other Treatments After Discharge: Follow-Up As Directed    Physician Certification: I certify the above information and transfer of Angel Molina  is necessary for the continuing treatment of the diagnosis listed and that he requires [address] for greater 30 days.      Update Admission H&P: No change in H&P    PHYSICIAN SIGNATURE:  {Esignature:[id number]}

## 2020-10-23 NOTE — DISCHARGE INSTR - DIET

## 2020-10-23 NOTE — PROGRESS NOTES
Physical Therapy Missed Treatment   Facility/Department: [facility] MED SURG W877/D157-02    NAME: Claudeen Cannon    : 1955 (71 y.o.)  MRN: [id number]    Account: [de-identified]  Gender: male    Chart reviewed, attempted PT at 11:02. Patient unavailable 2° to:    [x] Pt declined stating that he has not slept. Lengthy discussion regarding role of PT and concern for mobility deficits d/t reported dizziness with mobility attempts. Pt continues decline PT treatment at this time citing fatigue. Pt denies concerns for home going as he has slide board for transfers and uses electric w/c at baseline. Will attempt PT treatment again at earliest convenience.       Electronically signed by Cole Cárdenas PT on 10/23/20 at 11:15 AM EDT

## 2020-10-26 NOTE — PROGRESS NOTES
Physical Therapy  Facility/Department: [facility] MED SURG B556/D242-34  Physical Therapy Discharge      NAME: Brian Raya    : 1955 (71 y.o.)  MRN: [id number]    Account: [de-identified]  Gender: male      Patient has been discharged from acute care hospital. DC patient from current PT program.      Electronically signed by Nii Rome PT on 10/26/20 at 4:20 PM EDT

## 2023-10-04 ENCOUNTER — NURSING HOME VISIT (OUTPATIENT)
Dept: POST ACUTE CARE | Facility: EXTERNAL LOCATION | Age: 68
End: 2023-10-04
Payer: COMMERCIAL

## 2023-10-04 DIAGNOSIS — N18.6 TYPE 2 DIABETES MELLITUS WITH CHRONIC KIDNEY DISEASE ON CHRONIC DIALYSIS, WITH LONG-TERM CURRENT USE OF INSULIN (MULTI): Primary | ICD-10-CM

## 2023-10-04 DIAGNOSIS — K59.09 OTHER CONSTIPATION: ICD-10-CM

## 2023-10-04 DIAGNOSIS — N18.6 ESRD (END STAGE RENAL DISEASE) ON DIALYSIS (MULTI): ICD-10-CM

## 2023-10-04 DIAGNOSIS — Z79.4 TYPE 2 DIABETES MELLITUS WITH CHRONIC KIDNEY DISEASE ON CHRONIC DIALYSIS, WITH LONG-TERM CURRENT USE OF INSULIN (MULTI): Primary | ICD-10-CM

## 2023-10-04 DIAGNOSIS — Z99.2 TYPE 2 DIABETES MELLITUS WITH CHRONIC KIDNEY DISEASE ON CHRONIC DIALYSIS, WITH LONG-TERM CURRENT USE OF INSULIN (MULTI): Primary | ICD-10-CM

## 2023-10-04 DIAGNOSIS — E11.22 TYPE 2 DIABETES MELLITUS WITH CHRONIC KIDNEY DISEASE ON CHRONIC DIALYSIS, WITH LONG-TERM CURRENT USE OF INSULIN (MULTI): Primary | ICD-10-CM

## 2023-10-04 DIAGNOSIS — Z99.2 ESRD (END STAGE RENAL DISEASE) ON DIALYSIS (MULTI): ICD-10-CM

## 2023-10-04 PROCEDURE — 99309 SBSQ NF CARE MODERATE MDM 30: CPT | Performed by: NURSE PRACTITIONER

## 2023-10-04 NOTE — LETTER
Patient: Idalmis Daniels  : 1955    Encounter Date: 10/04/2023    Subjective  Patient ID: Idalmis Daniels is a 67 y.o. male who presents for No chief complaint on file..  66 yo male admitted to rehab at [facility] yesterday for history of ESRD without urine output, DM since he was in his 20's, and osteomyelitis on IV ATB.  He is getting dialysis 4 x per wk.  He does take Midodrine for low BP and BP today was 111/64.  Resident denies any pain.          Review of Systems   Constitutional:  Positive for fatigue. Negative for appetite change, chills and fever.   Eyes:         History of glaucoma.  Refuses eye drops.  States he gets double vision and pain in eyes when the eye drops are instilled.     Respiratory:  Negative for cough, shortness of breath and wheezing.    Cardiovascular:  Negative for chest pain, palpitations and leg swelling.   Gastrointestinal:  Positive for constipation. Negative for abdominal pain, diarrhea, nausea and vomiting.   Genitourinary:         No urine output   Skin: Negative.        Objective  Physical Exam  Constitutional:       General: He is not in acute distress.  Cardiovascular:      Rate and Rhythm: Normal rate and regular rhythm.      Heart sounds: Normal heart sounds.   Pulmonary:      Effort: Pulmonary effort is normal.      Breath sounds: Normal breath sounds. No wheezing, rhonchi or rales.   Abdominal:      General: There is no distension.      Tenderness: There is no abdominal tenderness.      Comments: BS hypoactive.  Resident states he has not had a BM in 5 days.    Neurological:      Mental Status: He is alert.         No current outpatient medications on file.     Assessment/Plan  Problem List Items Addressed This Visit          Endocrine/Metabolic    Type 2 diabetes mellitus with chronic kidney disease on chronic dialysis, with long-term current use of insulin (CMS/[facility]) - Primary       Gastrointestinal and Abdominal    Other constipation       Genitourinary and  Reproductive    ESRD (end stage renal disease) on dialysis (CMS/[facility])   Discontinue eye drops due to refusal.  Resident states he has eye appt in Nov.  Hold Midodrine if BP above 120.  Start on Miralax 30cc daily prn constipation.  May need Lactolose if Miralax unsuccessful.  Continue supportive care and  continue to monitor.             Electronically Signed By: TASHA Gallego   10/5/23  2:26 PM

## 2023-10-05 ENCOUNTER — NURSING HOME VISIT (OUTPATIENT)
Dept: POST ACUTE CARE | Facility: EXTERNAL LOCATION | Age: 68
End: 2023-10-05
Payer: COMMERCIAL

## 2023-10-05 DIAGNOSIS — K59.09 OTHER CONSTIPATION: ICD-10-CM

## 2023-10-05 DIAGNOSIS — N18.6 ESRD (END STAGE RENAL DISEASE) ON DIALYSIS (MULTI): ICD-10-CM

## 2023-10-05 DIAGNOSIS — Z99.2 ESRD (END STAGE RENAL DISEASE) ON DIALYSIS (MULTI): ICD-10-CM

## 2023-10-05 DIAGNOSIS — Z99.2 TYPE 2 DIABETES MELLITUS WITH CHRONIC KIDNEY DISEASE ON CHRONIC DIALYSIS, WITH LONG-TERM CURRENT USE OF INSULIN (MULTI): Primary | ICD-10-CM

## 2023-10-05 DIAGNOSIS — Z79.4 TYPE 2 DIABETES MELLITUS WITH CHRONIC KIDNEY DISEASE ON CHRONIC DIALYSIS, WITH LONG-TERM CURRENT USE OF INSULIN (MULTI): Primary | ICD-10-CM

## 2023-10-05 DIAGNOSIS — E11.22 TYPE 2 DIABETES MELLITUS WITH CHRONIC KIDNEY DISEASE ON CHRONIC DIALYSIS, WITH LONG-TERM CURRENT USE OF INSULIN (MULTI): Primary | ICD-10-CM

## 2023-10-05 DIAGNOSIS — N18.6 TYPE 2 DIABETES MELLITUS WITH CHRONIC KIDNEY DISEASE ON CHRONIC DIALYSIS, WITH LONG-TERM CURRENT USE OF INSULIN (MULTI): Primary | ICD-10-CM

## 2023-10-05 PROCEDURE — 99308 SBSQ NF CARE LOW MDM 20: CPT | Performed by: NURSE PRACTITIONER

## 2023-10-05 ASSESSMENT — ENCOUNTER SYMPTOMS
CONSTIPATION: 1
PALPITATIONS: 0
WHEEZING: 0
ABDOMINAL PAIN: 0
NAUSEA: 0
CHILLS: 0
SHORTNESS OF BREATH: 0
FATIGUE: 1
CARDIOVASCULAR NEGATIVE: 1
CONSTIPATION: 1
FEVER: 0
APPETITE CHANGE: 0
DIARRHEA: 0
VOMITING: 0
COUGH: 0
RESPIRATORY NEGATIVE: 1
FEVER: 0
APPETITE CHANGE: 0

## 2023-10-05 NOTE — LETTER
Patient: Idalmis Daniels  : 1955    Encounter Date: 10/05/2023    Subjective  Patient ID: Idalmis Daniels is a 67 y.o. male who presents for No chief complaint on file..  68 [facility] with history of DM, insulin use, dialysis 4 x wkly with history of osteomyelitis and ESRD.  Resident in dialysis room today.  Resident states he is doing well, no c/o other than continued constipation.  Miralax was given yesterday and he still has not had BM.  Vitals stable.          Review of Systems   Constitutional:  Negative for appetite change and fever.   Respiratory: Negative.     Cardiovascular: Negative.    Gastrointestinal:  Positive for constipation.   Skin: Negative.        Objective  Physical Exam  Constitutional:       General: He is not in acute distress.  Cardiovascular:      Rate and Rhythm: Normal rate and regular rhythm.   Pulmonary:      Effort: Pulmonary effort is normal.      Breath sounds: Normal breath sounds.   Abdominal:      General: There is no distension.      Tenderness: There is no abdominal tenderness.      Comments: BS hypoactive.    Musculoskeletal:         General: No swelling.   Skin:     General: Skin is warm and dry.   Neurological:      Mental Status: He is alert.         No current outpatient medications on file.     Assessment/Plan  Problem List Items Addressed This Visit          Endocrine/Metabolic    Type 2 diabetes mellitus with chronic kidney disease on chronic dialysis, with long-term current use of insulin (CMS/[facility]) - Primary       Gastrointestinal and Abdominal    Other constipation       Genitourinary and Reproductive    ESRD (end stage renal disease) on dialysis (CMS/[facility])   Continue same regimen at this time.  Will start him on Lactolose (10gm/15cc) today , 30 ml daily prn.             Electronically Signed By: RAMÓN Gallego-CNP   10/5/23  2:33 PM

## 2023-10-05 NOTE — PROGRESS NOTES
Subjective   Patient ID: Idalmis Daniels is a 67 y.o. male who presents for No chief complaint on file..  68 yo male admitted to rehab at [facility] yesterday for history of ESRD without urine output, DM since he was in his 20's, and osteomyelitis on IV ATB.  He is getting dialysis 4 x per wk.  He does take Midodrine for low BP and BP today was 111/64.  Resident denies any pain.          Review of Systems   Constitutional:  Positive for fatigue. Negative for appetite change, chills and fever.   Eyes:         History of glaucoma.  Refuses eye drops.  States he gets double vision and pain in eyes when the eye drops are instilled.     Respiratory:  Negative for cough, shortness of breath and wheezing.    Cardiovascular:  Negative for chest pain, palpitations and leg swelling.   Gastrointestinal:  Positive for constipation. Negative for abdominal pain, diarrhea, nausea and vomiting.   Genitourinary:         No urine output   Skin: Negative.        Objective   Physical Exam  Constitutional:       General: He is not in acute distress.  Cardiovascular:      Rate and Rhythm: Normal rate and regular rhythm.      Heart sounds: Normal heart sounds.   Pulmonary:      Effort: Pulmonary effort is normal.      Breath sounds: Normal breath sounds. No wheezing, rhonchi or rales.   Abdominal:      General: There is no distension.      Tenderness: There is no abdominal tenderness.      Comments: BS hypoactive.  Resident states he has not had a BM in 5 days.    Neurological:      Mental Status: He is alert.         No current outpatient medications on file.     Assessment/Plan   Problem List Items Addressed This Visit          Endocrine/Metabolic    Type 2 diabetes mellitus with chronic kidney disease on chronic dialysis, with long-term current use of insulin (CMS/[facility]) - Primary       Gastrointestinal and Abdominal    Other constipation       Genitourinary and Reproductive    ESRD (end stage renal disease) on dialysis (CMS/[facility])    Discontinue eye drops due to refusal.  Resident states he has eye appt in Nov.  Hold Midodrine if BP above 120.  Start on Miralax 30cc daily prn constipation.  May need Lactolose if Miralax unsuccessful.  Continue supportive care and  continue to monitor.

## 2023-10-05 NOTE — PROGRESS NOTES
Subjective   Patient ID: Idalmis Daniels is a 67 y.o. male who presents for No chief complaint on file..  68 yo male resident at [facility] on rehab unit with history of DM, insulin use, dialysis 4 x wkly with history of osteomyelitis and ESRD.  Resident in dialysis room today.  Resident states he is doing well, no c/o other than continued constipation.  Miralax was given yesterday and he still has not had BM.  Vitals stable.          Review of Systems   Constitutional:  Negative for appetite change and fever.   Respiratory: Negative.     Cardiovascular: Negative.    Gastrointestinal:  Positive for constipation.   Skin: Negative.        Objective   Physical Exam  Constitutional:       General: He is not in acute distress.  Cardiovascular:      Rate and Rhythm: Normal rate and regular rhythm.   Pulmonary:      Effort: Pulmonary effort is normal.      Breath sounds: Normal breath sounds.   Abdominal:      General: There is no distension.      Tenderness: There is no abdominal tenderness.      Comments: BS hypoactive.    Musculoskeletal:         General: No swelling.   Skin:     General: Skin is warm and dry.   Neurological:      Mental Status: He is alert.         No current outpatient medications on file.     Assessment/Plan   Problem List Items Addressed This Visit          Endocrine/Metabolic    Type 2 diabetes mellitus with chronic kidney disease on chronic dialysis, with long-term current use of insulin (CMS/[facility]) - Primary       Gastrointestinal and Abdominal    Other constipation       Genitourinary and Reproductive    ESRD (end stage renal disease) on dialysis (CMS/[facility])   Continue same regimen at this time.  Will start him on Lactolose (10gm/15cc) today , 30 ml daily prn.

## 2023-10-06 ENCOUNTER — NURSING HOME VISIT (OUTPATIENT)
Dept: POST ACUTE CARE | Facility: EXTERNAL LOCATION | Age: 68
End: 2023-10-06
Payer: COMMERCIAL

## 2023-10-06 DIAGNOSIS — Z99.2 ESRD (END STAGE RENAL DISEASE) ON DIALYSIS (MULTI): Primary | ICD-10-CM

## 2023-10-06 DIAGNOSIS — M86.171 ACUTE OSTEOMYELITIS OF TOE OF RIGHT FOOT (MULTI): ICD-10-CM

## 2023-10-06 DIAGNOSIS — N18.6 ESRD (END STAGE RENAL DISEASE) ON DIALYSIS (MULTI): Primary | ICD-10-CM

## 2023-10-06 PROCEDURE — 99304 1ST NF CARE SF/LOW MDM 25: CPT | Performed by: INTERNAL MEDICINE

## 2023-10-06 NOTE — LETTER
Patient: Idalmis Daniels  : 1955    Encounter Date: 10/06/2023    Pt was seen in the NH.  68 yo m with PMH ESRD on HD DM2 here for aabx and rehab for osteomylitis and right 2ed toe amputation Pt is in usual state , no complaint  General appearance: Comfortable, no distress  ROS: No SOB  Medications reviewed  Head: Normal  Neck: Soft  Heart: Regular  Lungs: Clear  Abdomen: soft  Ext right le on dressing    Impression: clinically doing fine, continue current management    Problem List Items Addressed This Visit       ESRD (end stage renal disease) on dialysis (CMS/[facility]) - Primary     Other Visit Diagnoses       Acute osteomyelitis of toe of right foot (CMS/[facility])                   Electronically Signed By: Kuldeep Dwyer MD   10/6/23 10:01 PM

## 2023-10-07 NOTE — PROGRESS NOTES
Pt was seen in the NH.  66 yo m with PMH ESRD on HD DM2 here for aabx and rehab for osteomylitis and right 2ed toe amputation Pt is in usual state , no complaint  General appearance: Comfortable, no distress  ROS: No SOB  Medications reviewed  Head: Normal  Neck: Soft  Heart: Regular  Lungs: Clear  Abdomen: soft  Ext right le on dressing    Impression: clinically doing fine, continue current management    Problem List Items Addressed This Visit       ESRD (end stage renal disease) on dialysis (CMS/[facility]) - Primary     Other Visit Diagnoses       Acute osteomyelitis of toe of right foot (CMS/[facility])

## 2023-10-17 ENCOUNTER — NURSING HOME VISIT (OUTPATIENT)
Dept: POST ACUTE CARE | Facility: EXTERNAL LOCATION | Age: 68
End: 2023-10-17
Payer: COMMERCIAL

## 2023-10-17 DIAGNOSIS — Z79.4 TYPE 2 DIABETES MELLITUS WITH CHRONIC KIDNEY DISEASE ON CHRONIC DIALYSIS, WITH LONG-TERM CURRENT USE OF INSULIN (MULTI): Primary | ICD-10-CM

## 2023-10-17 DIAGNOSIS — Z99.2 ESRD (END STAGE RENAL DISEASE) ON DIALYSIS (MULTI): ICD-10-CM

## 2023-10-17 DIAGNOSIS — Z99.2 TYPE 2 DIABETES MELLITUS WITH CHRONIC KIDNEY DISEASE ON CHRONIC DIALYSIS, WITH LONG-TERM CURRENT USE OF INSULIN (MULTI): Primary | ICD-10-CM

## 2023-10-17 DIAGNOSIS — E11.22 TYPE 2 DIABETES MELLITUS WITH CHRONIC KIDNEY DISEASE ON CHRONIC DIALYSIS, WITH LONG-TERM CURRENT USE OF INSULIN (MULTI): Primary | ICD-10-CM

## 2023-10-17 DIAGNOSIS — N18.6 TYPE 2 DIABETES MELLITUS WITH CHRONIC KIDNEY DISEASE ON CHRONIC DIALYSIS, WITH LONG-TERM CURRENT USE OF INSULIN (MULTI): Primary | ICD-10-CM

## 2023-10-17 DIAGNOSIS — N18.6 ESRD (END STAGE RENAL DISEASE) ON DIALYSIS (MULTI): ICD-10-CM

## 2023-10-17 PROCEDURE — 99309 SBSQ NF CARE MODERATE MDM 30: CPT | Performed by: NURSE PRACTITIONER

## 2023-10-17 ASSESSMENT — ENCOUNTER SYMPTOMS
CHILLS: 0
FEVER: 0
FATIGUE: 0
VOMITING: 0
APPETITE CHANGE: 0
RESPIRATORY NEGATIVE: 1
DIARRHEA: 0
CARDIOVASCULAR NEGATIVE: 1
NAUSEA: 0

## 2023-10-17 NOTE — LETTER
Patient: Idalmis Daniels  : 1955    Encounter Date: 10/17/2023    Subjective  Patient ID: Idalmis Daniels is a 67 y.o. male who presents for No chief complaint on file..  68 yo male at [facility] on rehab with history of cellulitis, CKD, dialysis and DM.  Resident denies any complaints or concerns.  Staff reports residents BS ranging in 200's most of the time.          Review of Systems   Constitutional:  Negative for appetite change, chills, fatigue and fever.   Respiratory: Negative.     Cardiovascular: Negative.    Gastrointestinal:  Negative for diarrhea, nausea and vomiting.       Objective  Physical Exam  Constitutional:       General: He is not in acute distress.  Cardiovascular:      Rate and Rhythm: Normal rate and regular rhythm.   Pulmonary:      Effort: Pulmonary effort is normal.      Breath sounds: Normal breath sounds.   Abdominal:      General: Bowel sounds are normal.   Neurological:      Mental Status: He is alert and oriented to person, place, and time.   Psychiatric:         Mood and Affect: Mood normal.         No current outpatient medications on file.     Assessment/Plan  Problem List Items Addressed This Visit          Endocrine/Metabolic    Type 2 diabetes mellitus with chronic kidney disease on chronic dialysis, with long-term current use of insulin (CMS/[facility]) - Primary       Genitourinary and Reproductive    ESRD (end stage renal disease) on dialysis (CMS/[facility])   Reviewed BS readings; ranging from 114 to 333.  Resident on SS only.  Long discussion regarding his diet and proper nutrition.  Will start him on Lantus 10 units at hs.  Continue to monitor.             Electronically Signed By: TASHA Gallego   10/17/23  9:34 AM

## 2023-10-17 NOTE — PROGRESS NOTES
Subjective   Patient ID: Idalmis Daniels is a 67 y.o. male who presents for No chief complaint on file..  66 yo male at [facility] on rehab with history of cellulitis, CKD, dialysis and DM.  Resident denies any complaints or concerns.  Staff reports residents BS ranging in 200's most of the time.          Review of Systems   Constitutional:  Negative for appetite change, chills, fatigue and fever.   Respiratory: Negative.     Cardiovascular: Negative.    Gastrointestinal:  Negative for diarrhea, nausea and vomiting.       Objective   Physical Exam  Constitutional:       General: He is not in acute distress.  Cardiovascular:      Rate and Rhythm: Normal rate and regular rhythm.   Pulmonary:      Effort: Pulmonary effort is normal.      Breath sounds: Normal breath sounds.   Abdominal:      General: Bowel sounds are normal.   Neurological:      Mental Status: He is alert and oriented to person, place, and time.   Psychiatric:         Mood and Affect: Mood normal.         No current outpatient medications on file.     Assessment/Plan   Problem List Items Addressed This Visit          Endocrine/Metabolic    Type 2 diabetes mellitus with chronic kidney disease on chronic dialysis, with long-term current use of insulin (CMS/[facility]) - Primary       Genitourinary and Reproductive    ESRD (end stage renal disease) on dialysis (CMS/[facility])   Reviewed BS readings; ranging from 114 to 333.  Resident on SS only.  Long discussion regarding his diet and proper nutrition.  Will start him on Lantus 10 units at hs.  Continue to monitor.

## 2023-10-23 ENCOUNTER — HOSPITAL ENCOUNTER (EMERGENCY)
Facility: HOSPITAL | Age: 68
Discharge: HOME | End: 2023-10-23
Attending: EMERGENCY MEDICINE
Payer: COMMERCIAL

## 2023-10-23 VITALS
SYSTOLIC BLOOD PRESSURE: 125 MMHG | WEIGHT: 250 LBS | HEART RATE: 75 BPM | OXYGEN SATURATION: 98 % | TEMPERATURE: 96.8 F | RESPIRATION RATE: 16 BRPM | HEIGHT: 74 IN | DIASTOLIC BLOOD PRESSURE: 71 MMHG | BODY MASS INDEX: 32.08 KG/M2

## 2023-10-23 DIAGNOSIS — T82.9XXA COMPLICATION ASSOCIATED WITH DIALYSIS CATHETER: ICD-10-CM

## 2023-10-23 DIAGNOSIS — T82.838D BLEEDING DUE TO DIALYSIS CATHETER PLACEMENT, SUBSEQUENT ENCOUNTER: Primary | ICD-10-CM

## 2023-10-23 PROCEDURE — 99282 EMERGENCY DEPT VISIT SF MDM: CPT

## 2023-10-23 PROCEDURE — 99283 EMERGENCY DEPT VISIT LOW MDM: CPT | Performed by: EMERGENCY MEDICINE

## 2023-10-23 ASSESSMENT — ENCOUNTER SYMPTOMS
FATIGUE: 0
CHILLS: 0
BRUISES/BLEEDS EASILY: 1
FEVER: 0
ADENOPATHY: 0

## 2023-10-23 ASSESSMENT — COLUMBIA-SUICIDE SEVERITY RATING SCALE - C-SSRS
6. HAVE YOU EVER DONE ANYTHING, STARTED TO DO ANYTHING, OR PREPARED TO DO ANYTHING TO END YOUR LIFE?: NO
1. IN THE PAST MONTH, HAVE YOU WISHED YOU WERE DEAD OR WISHED YOU COULD GO TO SLEEP AND NOT WAKE UP?: NO
2. HAVE YOU ACTUALLY HAD ANY THOUGHTS OF KILLING YOURSELF?: NO

## 2023-10-23 ASSESSMENT — PAIN - FUNCTIONAL ASSESSMENT: PAIN_FUNCTIONAL_ASSESSMENT: 0-10

## 2023-10-23 NOTE — ED PROVIDER NOTES
Chief Complaint: BLEEDING FROM DIALYSIS CATH    This is a 67-year-old male who had dialysis today apparently has had some oozing bleeding from his dialysis catheter he has had direct pressure applied and presents by EMS for evaluation at this time, devise any bleeding diathesis denies any shortness of breath cough or congestion otherwise           Review of Systems   Constitutional:  Negative for chills, fatigue and fever.   HENT: Negative.     Skin:         Leaking from dialysis catheter site   Hematological:  Negative for adenopathy. Bruises/bleeds easily.   All other systems reviewed and are negative.       Physical Exam  Constitutional:       Appearance: Normal appearance.   HENT:      Head: Normocephalic.      Nose: Nose normal.      Comments: There is no epistaxis noted     Mouth/Throat:      Mouth: Mucous membranes are dry.      Pharynx: Oropharynx is clear.   Eyes:      Conjunctiva/sclera: Conjunctivae normal.      Pupils: Pupils are equal, round, and reactive to light.   Cardiovascular:      Rate and Rhythm: Normal rate and regular rhythm.   Pulmonary:      Effort: Pulmonary effort is normal.      Breath sounds: Normal breath sounds.   Musculoskeletal:         General: No swelling or tenderness. Normal range of motion.      Cervical back: Normal range of motion and neck supple.   Skin:     Findings: No bruising.      Comments: Left mid upper arm with obvious fistula there is 1 punctate area of blood tingeing but without any active bleeding at this time there is no bruising.   Neurological:      General: No focal deficit present.      Mental Status: He is alert and oriented to person, place, and time.          Labs Reviewed - No data to display     No orders to display        Wound Care    Performed by: Anish Pierce MD  Authorized by: Anish Pierce MD    Comments:      Gelfoam 4 x 4 and Coban dressing was applied there is no additional bleeding       Medical Decision Making  After treatment here patient  had no additional bleeding we will continue with his dressing on as is and is to follow-up with his primary care or dialysis         Diagnoses as of 10/23/23 2051   Bleeding due to dialysis catheter placement, subsequent encounter   Complication associated with dialysis catheter                    Anish Pierce MD  10/23/23 2051

## 2023-10-24 ENCOUNTER — NURSING HOME VISIT (OUTPATIENT)
Dept: POST ACUTE CARE | Facility: EXTERNAL LOCATION | Age: 68
End: 2023-10-24
Payer: COMMERCIAL

## 2023-10-24 DIAGNOSIS — Z79.4 TYPE 2 DIABETES MELLITUS WITH CHRONIC KIDNEY DISEASE ON CHRONIC DIALYSIS, WITH LONG-TERM CURRENT USE OF INSULIN (MULTI): Primary | ICD-10-CM

## 2023-10-24 DIAGNOSIS — Z99.2 TYPE 2 DIABETES MELLITUS WITH CHRONIC KIDNEY DISEASE ON CHRONIC DIALYSIS, WITH LONG-TERM CURRENT USE OF INSULIN (MULTI): Primary | ICD-10-CM

## 2023-10-24 DIAGNOSIS — N18.6 TYPE 2 DIABETES MELLITUS WITH CHRONIC KIDNEY DISEASE ON CHRONIC DIALYSIS, WITH LONG-TERM CURRENT USE OF INSULIN (MULTI): Primary | ICD-10-CM

## 2023-10-24 DIAGNOSIS — E11.22 TYPE 2 DIABETES MELLITUS WITH CHRONIC KIDNEY DISEASE ON CHRONIC DIALYSIS, WITH LONG-TERM CURRENT USE OF INSULIN (MULTI): Primary | ICD-10-CM

## 2023-10-24 PROCEDURE — 99308 SBSQ NF CARE LOW MDM 20: CPT | Performed by: NURSE PRACTITIONER

## 2023-10-24 NOTE — LETTER
Patient: Idalmis Daniels  : 1955    Encounter Date: 10/24/2023    Subjective  Patient ID: Idalmis Daniels is a 67 y.o. male who presents for No chief complaint on file..  68 yo male at [facility] on rehab with history of osteomyelitis, CKD on dialysis and DM.  Resident was sent to ER yesterday for cath bleeding of left arm after dialysis.  Resident reports doing well today.  No signs of bleeding.  Reviewed blood sugars with resident and again encouraged resident to follow proper nutrition to assist in lowering his BS.          Review of Systems   Constitutional:  Negative for chills and fever.   Respiratory:  Negative for cough, shortness of breath and wheezing.    Cardiovascular:  Positive for leg swelling. Negative for chest pain and palpitations.   Gastrointestinal: Negative.    Endocrine:        No urine output       Objective  Physical Exam  Constitutional:       General: He is not in acute distress.  Cardiovascular:      Rate and Rhythm: Normal rate and regular rhythm.   Pulmonary:      Effort: Pulmonary effort is normal.      Breath sounds: No wheezing, rhonchi or rales.   Abdominal:      General: Bowel sounds are normal.   Skin:     General: Skin is warm and dry.      Comments: Ace wrap around left upper FA around fistula.  Bruit and thrill noted.  No bleeding noted.    Neurological:      Mental Status: He is alert.         No current outpatient medications on file.     Assessment/Plan  Problem List Items Addressed This Visit          Endocrine/Metabolic    Type 2 diabetes mellitus with chronic kidney disease on chronic dialysis, with long-term current use of insulin (CMS/[facility]) - Primary   Blood sugars reading 151, 313, 215, 157.  Currently taking Lantus 10 units.  Will increase his Lantus to 13 units at .  Continue all other diabetic meds the same at this time.  Encouraged resident to follow proper nutrition as discussed.  He is instructed to follow up with his endocrinologist upon discharge from [facility]  and his PCP.  Continue to monitor at this time and continue supportive care.             Electronically Signed By: TASHA Gallego   10/29/23  7:14 PM

## 2023-10-29 ASSESSMENT — ENCOUNTER SYMPTOMS
COUGH: 0
ENDOCRINE COMMENTS: NO URINE OUTPUT
PALPITATIONS: 0
CHILLS: 0
WHEEZING: 0
SHORTNESS OF BREATH: 0
FEVER: 0
GASTROINTESTINAL NEGATIVE: 1

## 2023-10-29 NOTE — PROGRESS NOTES
Subjective   Patient ID: Idalmis Daniels is a 67 y.o. male who presents for No chief complaint on file..  68 yo male at [facility] on rehab with history of osteomyelitis, CKD on dialysis and DM.  Resident was sent to ER yesterday for cath bleeding of left arm after dialysis.  Resident reports doing well today.  No signs of bleeding.  Reviewed blood sugars with resident and again encouraged resident to follow proper nutrition to assist in lowering his BS.          Review of Systems   Constitutional:  Negative for chills and fever.   Respiratory:  Negative for cough, shortness of breath and wheezing.    Cardiovascular:  Positive for leg swelling. Negative for chest pain and palpitations.   Gastrointestinal: Negative.    Endocrine:        No urine output       Objective   Physical Exam  Constitutional:       General: He is not in acute distress.  Cardiovascular:      Rate and Rhythm: Normal rate and regular rhythm.   Pulmonary:      Effort: Pulmonary effort is normal.      Breath sounds: No wheezing, rhonchi or rales.   Abdominal:      General: Bowel sounds are normal.   Skin:     General: Skin is warm and dry.      Comments: Ace wrap around left upper FA around fistula.  Bruit and thrill noted.  No bleeding noted.    Neurological:      Mental Status: He is alert.         No current outpatient medications on file.     Assessment/Plan   Problem List Items Addressed This Visit          Endocrine/Metabolic    Type 2 diabetes mellitus with chronic kidney disease on chronic dialysis, with long-term current use of insulin (CMS/[facility]) - Primary   Blood sugars reading 151, 313, 215, 157.  Currently taking Lantus 10 units.  Will increase his Lantus to 13 units at .  Continue all other diabetic meds the same at this time.  Encouraged resident to follow proper nutrition as discussed.  He is instructed to follow up with his endocrinologist upon discharge from [facility] and his PCP.  Continue to monitor at this time and continue supportive  care.

## 2023-10-30 ENCOUNTER — TELEPHONE (OUTPATIENT)
Dept: PRIMARY CARE | Facility: CLINIC | Age: 68
End: 2023-10-30
Payer: COMMERCIAL

## 2023-10-30 NOTE — TELEPHONE ENCOUNTER
RECEIVED FAX FROM [address] STATING [facility] IS NOT COVERED BY INSURANCE. PREFERRED MEDS ARE LANTUS SOLOSTAR, TOUJEO SOLOSTAR, TRESIBA FLEXTOUCH, AND LEVEMIR FLEXTOUCH. WILL YOU CONSIDER CHANGING MED TO ONE OF THESE ALTERNATIVES?

## 2023-10-31 NOTE — TELEPHONE ENCOUNTER
Patient's phone is out of service when I have tried to contact, so a letter has been written up and sent to patient's address on file to call our office and get scheduled.

## 2024-01-11 ENCOUNTER — NURSING HOME VISIT (OUTPATIENT)
Dept: POST ACUTE CARE | Facility: EXTERNAL LOCATION | Age: 69
End: 2024-01-11
Payer: COMMERCIAL

## 2024-01-11 DIAGNOSIS — M54.42 CHRONIC BILATERAL LOW BACK PAIN WITH BILATERAL SCIATICA: ICD-10-CM

## 2024-01-11 DIAGNOSIS — Z99.2 TYPE 2 DIABETES MELLITUS WITH CHRONIC KIDNEY DISEASE ON CHRONIC DIALYSIS, WITH LONG-TERM CURRENT USE OF INSULIN (MULTI): Primary | ICD-10-CM

## 2024-01-11 DIAGNOSIS — Z79.4 TYPE 2 DIABETES MELLITUS WITH CHRONIC KIDNEY DISEASE ON CHRONIC DIALYSIS, WITH LONG-TERM CURRENT USE OF INSULIN (MULTI): Primary | ICD-10-CM

## 2024-01-11 DIAGNOSIS — K59.09 OTHER CONSTIPATION: ICD-10-CM

## 2024-01-11 DIAGNOSIS — E11.22 TYPE 2 DIABETES MELLITUS WITH CHRONIC KIDNEY DISEASE ON CHRONIC DIALYSIS, WITH LONG-TERM CURRENT USE OF INSULIN (MULTI): Primary | ICD-10-CM

## 2024-01-11 DIAGNOSIS — G89.29 CHRONIC BILATERAL LOW BACK PAIN WITH BILATERAL SCIATICA: ICD-10-CM

## 2024-01-11 DIAGNOSIS — N18.6 TYPE 2 DIABETES MELLITUS WITH CHRONIC KIDNEY DISEASE ON CHRONIC DIALYSIS, WITH LONG-TERM CURRENT USE OF INSULIN (MULTI): Primary | ICD-10-CM

## 2024-01-11 DIAGNOSIS — M54.41 CHRONIC BILATERAL LOW BACK PAIN WITH BILATERAL SCIATICA: ICD-10-CM

## 2024-01-11 PROCEDURE — 99309 SBSQ NF CARE MODERATE MDM 30: CPT | Performed by: NURSE PRACTITIONER

## 2024-01-11 NOTE — LETTER
Patient: Idalmis Daniels  : 1955    Encounter Date: 2024    Subjective  Patient ID: Idalmis Daniels is a 68 y.o. male who presents for No chief complaint on file..  69 yo male at [facility] on rehab with history of DMII, coccyx wound with wound vac, CKF on dialysis.  Labs and chart reviewed.  Resident states he is having some back pain, it is chronic; typically sees pain .          Review of Systems   Constitutional:  Negative for fever.   Respiratory: Negative.     Cardiovascular: Negative.    Gastrointestinal:  Positive for constipation.   Musculoskeletal:  Positive for back pain and myalgias.   Neurological:  Positive for weakness.       Objective  Physical Exam  Constitutional:       General: He is not in acute distress.  Cardiovascular:      Rate and Rhythm: Normal rate and regular rhythm.   Pulmonary:      Effort: Pulmonary effort is normal.      Breath sounds: Normal breath sounds.   Abdominal:      General: Bowel sounds are normal.   Skin:     General: Skin is warm and dry.   Neurological:      Mental Status: He is alert.         No current outpatient medications on file.     Assessment/Plan  Problem List Items Addressed This Visit          Endocrine/Metabolic    Type 2 diabetes mellitus with chronic kidney disease on chronic dialysis, with long-term current use of insulin (CMS/[facility]) - Primary       Gastrointestinal and Abdominal    Other constipation       Musculoskeletal and Injuries    Chronic bilateral low back pain with bilateral sciatica   Dm:  HGBA1C IS 8.0.  Will increase his Lantus by 3 units.  Discussed proper nutrition.    Constipation;  Miralax daily.  Rectal suppository MWF.  Chronic lumbago;  Will increase his Lyrica to 50mg tid.  Continue to monitor.             Electronically Signed By: RAMÓN Gallego-CNP   24  2:37 PM

## 2024-01-12 ENCOUNTER — NURSING HOME VISIT (OUTPATIENT)
Dept: POST ACUTE CARE | Facility: EXTERNAL LOCATION | Age: 69
End: 2024-01-12
Payer: COMMERCIAL

## 2024-01-12 DIAGNOSIS — N18.6 ESRD (END STAGE RENAL DISEASE) ON DIALYSIS (MULTI): ICD-10-CM

## 2024-01-12 DIAGNOSIS — Z99.2 ESRD (END STAGE RENAL DISEASE) ON DIALYSIS (MULTI): ICD-10-CM

## 2024-01-12 DIAGNOSIS — M19.012 PRIMARY OSTEOARTHRITIS OF BOTH SHOULDERS: Primary | ICD-10-CM

## 2024-01-12 DIAGNOSIS — M19.011 PRIMARY OSTEOARTHRITIS OF BOTH SHOULDERS: Primary | ICD-10-CM

## 2024-01-12 PROCEDURE — 99304 1ST NF CARE SF/LOW MDM 25: CPT | Performed by: INTERNAL MEDICINE

## 2024-01-12 NOTE — LETTER
Patient: Idalmis Daniels  : 1955    Encounter Date: 2024    Pt was seen in the NH.  69 YO M WITH PMH PVD DM2 HTN HERE FOR REHAB AFETR GROIN ABSCESS I&D AND WOUND VAC , CO CHRONIC SHOULDER PAIN, CURRENT MEDS NOT EFFECTIVE  General appearance: Comfortable, no distress  ROS: No SOB  Medications reviewed  Head: Normal  Neck: Soft  Heart: Regular  Lungs: Clear  Abdomen: soft  Ext no edema chronic skin cheanges, old amputation of toes, tenderness both shoulder with limitation in movmets    Impression: add norco, , continue current management    Problem List Items Addressed This Visit       ESRD (end stage renal disease) on dialysis (CMS/[facility])     Other Visit Diagnoses       Primary osteoarthritis of both shoulders    -  Primary               Electronically Signed By: Kuldeep Dwyer MD   24  9:09 PM

## 2024-01-13 NOTE — PROGRESS NOTES
Pt was seen in the NH.  67 YO M WITH PMH PVD DM2 HTN HERE FOR REHAB AFETR GROIN ABSCESS I&D AND WOUND VAC , CO CHRONIC SHOULDER PAIN, CURRENT MEDS NOT EFFECTIVE  General appearance: Comfortable, no distress  ROS: No SOB  Medications reviewed  Head: Normal  Neck: Soft  Heart: Regular  Lungs: Clear  Abdomen: soft  Ext no edema chronic skin cheanges, old amputation of toes, tenderness both shoulder with limitation in movmets    Impression: add norco, , continue current management    Problem List Items Addressed This Visit       ESRD (end stage renal disease) on dialysis (CMS/[facility])     Other Visit Diagnoses       Primary osteoarthritis of both shoulders    -  Primary

## 2024-01-14 PROBLEM — M54.41 CHRONIC BILATERAL LOW BACK PAIN WITH BILATERAL SCIATICA: Status: ACTIVE | Noted: 2024-01-14

## 2024-01-14 PROBLEM — G89.29 CHRONIC BILATERAL LOW BACK PAIN WITH BILATERAL SCIATICA: Status: ACTIVE | Noted: 2024-01-14

## 2024-01-14 PROBLEM — M54.42 CHRONIC BILATERAL LOW BACK PAIN WITH BILATERAL SCIATICA: Status: ACTIVE | Noted: 2024-01-14

## 2024-01-14 ASSESSMENT — ENCOUNTER SYMPTOMS
FEVER: 0
WEAKNESS: 1
RESPIRATORY NEGATIVE: 1
CARDIOVASCULAR NEGATIVE: 1
BACK PAIN: 1
CONSTIPATION: 1
MYALGIAS: 1

## 2024-01-14 NOTE — PROGRESS NOTES
Subjective   Patient ID: Idalmis Daniels is a 68 y.o. male who presents for No chief complaint on file..  69 yo male at [facility] on rehab with history of DMII, coccyx wound with wound vac, CKF on dialysis.  Labs and chart reviewed.  Resident states he is having some back pain, it is chronic; typically sees pain .          Review of Systems   Constitutional:  Negative for fever.   Respiratory: Negative.     Cardiovascular: Negative.    Gastrointestinal:  Positive for constipation.   Musculoskeletal:  Positive for back pain and myalgias.   Neurological:  Positive for weakness.       Objective   Physical Exam  Constitutional:       General: He is not in acute distress.  Cardiovascular:      Rate and Rhythm: Normal rate and regular rhythm.   Pulmonary:      Effort: Pulmonary effort is normal.      Breath sounds: Normal breath sounds.   Abdominal:      General: Bowel sounds are normal.   Skin:     General: Skin is warm and dry.   Neurological:      Mental Status: He is alert.         No current outpatient medications on file.     Assessment/Plan   Problem List Items Addressed This Visit          Endocrine/Metabolic    Type 2 diabetes mellitus with chronic kidney disease on chronic dialysis, with long-term current use of insulin (CMS/[facility]) - Primary       Gastrointestinal and Abdominal    Other constipation       Musculoskeletal and Injuries    Chronic bilateral low back pain with bilateral sciatica   Dm:  HGBA1C IS 8.0.  Will increase his Lantus by 3 units.  Discussed proper nutrition.    Constipation;  Miralax daily.  Rectal suppository MWF.  Chronic lumbago;  Will increase his Lyrica to 50mg tid.  Continue to monitor.

## 2024-01-25 ENCOUNTER — NURSING HOME VISIT (OUTPATIENT)
Dept: POST ACUTE CARE | Facility: EXTERNAL LOCATION | Age: 69
End: 2024-01-25
Payer: COMMERCIAL

## 2024-01-25 DIAGNOSIS — R53.81 MALAISE: ICD-10-CM

## 2024-01-25 DIAGNOSIS — N18.6 ESRD (END STAGE RENAL DISEASE) ON DIALYSIS (MULTI): Primary | ICD-10-CM

## 2024-01-25 DIAGNOSIS — Z99.2 ESRD (END STAGE RENAL DISEASE) ON DIALYSIS (MULTI): Primary | ICD-10-CM

## 2024-01-25 PROCEDURE — 99308 SBSQ NF CARE LOW MDM 20: CPT | Performed by: INTERNAL MEDICINE

## 2024-01-25 NOTE — PROGRESS NOTES
Pt was seen in the NH.  He states does not feel very good today nothing specific, this morning his BP was low , OK now, under HD now  General appearance: Comfortable, no distress  ROS: No SOB  Medications reviewed  Head: Normal  Neck: Soft  Heart: Regular  Lungs: Clear  Abdomen: soft  /75 PULE 93  sat 100%  Impression: will watch, continue current management    Problem List Items Addressed This Visit       ESRD (end stage renal disease) on dialysis (CMS/[facility]) - Primary     Other Visit Diagnoses       Malaise

## 2024-01-25 NOTE — LETTER
Patient: Idalmis Daniels  : 1955    Encounter Date: 2024    Pt was seen in the NH.  He states does not feel very good today nothing specific, this morning his BP was low , OK now, under HD now  General appearance: Comfortable, no distress  ROS: No SOB  Medications reviewed  Head: Normal  Neck: Soft  Heart: Regular  Lungs: Clear  Abdomen: soft  /75 PULE 93  sat 100%  Impression: will watch, continue current management    Problem List Items Addressed This Visit       ESRD (end stage renal disease) on dialysis (CMS/[facility]) - Primary     Other Visit Diagnoses       Malaise                   Electronically Signed By: Kuldeep Dwyer MD   24  5:42 PM

## 2024-01-30 ENCOUNTER — HOSPITAL ENCOUNTER (OUTPATIENT)
Dept: CARDIOLOGY | Facility: HOSPITAL | Age: 69
Discharge: HOME | End: 2024-01-30
Payer: COMMERCIAL

## 2024-01-30 ENCOUNTER — HOSPITAL ENCOUNTER (EMERGENCY)
Facility: HOSPITAL | Age: 69
Discharge: HOME | End: 2024-01-31
Attending: EMERGENCY MEDICINE
Payer: COMMERCIAL

## 2024-01-30 ENCOUNTER — APPOINTMENT (OUTPATIENT)
Dept: RADIOLOGY | Facility: HOSPITAL | Age: 69
End: 2024-01-30
Payer: COMMERCIAL

## 2024-01-30 DIAGNOSIS — R10.9 ABDOMINAL PAIN, UNSPECIFIED ABDOMINAL LOCATION: ICD-10-CM

## 2024-01-30 DIAGNOSIS — E87.5 HYPERKALEMIA: Primary | ICD-10-CM

## 2024-01-30 DIAGNOSIS — Z99.2 DIALYSIS PATIENT (CMS-HCC): ICD-10-CM

## 2024-01-30 LAB
ALBUMIN SERPL BCP-MCNC: 3.6 G/DL (ref 3.4–5)
ALP SERPL-CCNC: 221 U/L (ref 33–136)
ALT SERPL W P-5'-P-CCNC: 31 U/L (ref 10–52)
ANION GAP SERPL CALC-SCNC: 18 MMOL/L (ref 10–20)
AST SERPL W P-5'-P-CCNC: 30 U/L (ref 9–39)
BILIRUB SERPL-MCNC: 0.4 MG/DL (ref 0–1.2)
BUN SERPL-MCNC: 69 MG/DL (ref 6–23)
CALCIUM SERPL-MCNC: 9.1 MG/DL (ref 8.6–10.3)
CHLORIDE SERPL-SCNC: 97 MMOL/L (ref 98–107)
CO2 SERPL-SCNC: 26 MMOL/L (ref 21–32)
CREAT SERPL-MCNC: 10.62 MG/DL (ref 0.5–1.3)
EGFRCR SERPLBLD CKD-EPI 2021: 5 ML/MIN/1.73M*2
ERYTHROCYTE [DISTWIDTH] IN BLOOD BY AUTOMATED COUNT: 15.8 % (ref 11.5–14.5)
GLUCOSE SERPL-MCNC: 112 MG/DL (ref 74–99)
HCT VFR BLD AUTO: 35.9 % (ref 41–52)
HGB BLD-MCNC: 11.2 G/DL (ref 13.5–17.5)
MCH RBC QN AUTO: 28.5 PG (ref 26–34)
MCHC RBC AUTO-ENTMCNC: 31.2 G/DL (ref 32–36)
MCV RBC AUTO: 91 FL (ref 80–100)
NRBC BLD-RTO: 0 /100 WBCS (ref 0–0)
PLATELET # BLD AUTO: 257 X10*3/UL (ref 150–450)
POTASSIUM SERPL-SCNC: 6.3 MMOL/L (ref 3.5–5.3)
PROT SERPL-MCNC: 7.8 G/DL (ref 6.4–8.2)
RBC # BLD AUTO: 3.93 X10*6/UL (ref 4.5–5.9)
SODIUM SERPL-SCNC: 135 MMOL/L (ref 136–145)
WBC # BLD AUTO: 8.2 X10*3/UL (ref 4.4–11.3)

## 2024-01-30 PROCEDURE — 93005 ELECTROCARDIOGRAM TRACING: CPT

## 2024-01-30 PROCEDURE — 85027 COMPLETE CBC AUTOMATED: CPT | Performed by: PHYSICIAN ASSISTANT

## 2024-01-30 PROCEDURE — 74176 CT ABD & PELVIS W/O CONTRAST: CPT

## 2024-01-30 PROCEDURE — 96375 TX/PRO/DX INJ NEW DRUG ADDON: CPT

## 2024-01-30 PROCEDURE — 99285 EMERGENCY DEPT VISIT HI MDM: CPT | Mod: 25

## 2024-01-30 PROCEDURE — 2500000004 HC RX 250 GENERAL PHARMACY W/ HCPCS (ALT 636 FOR OP/ED): Performed by: PHYSICIAN ASSISTANT

## 2024-01-30 PROCEDURE — 93005 ELECTROCARDIOGRAM TRACING: CPT | Mod: 59

## 2024-01-30 PROCEDURE — 2500000005 HC RX 250 GENERAL PHARMACY W/O HCPCS: Performed by: PHYSICIAN ASSISTANT

## 2024-01-30 PROCEDURE — 36415 COLL VENOUS BLD VENIPUNCTURE: CPT | Performed by: PHYSICIAN ASSISTANT

## 2024-01-30 PROCEDURE — 2500000001 HC RX 250 WO HCPCS SELF ADMINISTERED DRUGS (ALT 637 FOR MEDICARE OP): Performed by: PHYSICIAN ASSISTANT

## 2024-01-30 PROCEDURE — 80053 COMPREHEN METABOLIC PANEL: CPT | Performed by: PHYSICIAN ASSISTANT

## 2024-01-30 PROCEDURE — 96374 THER/PROPH/DIAG INJ IV PUSH: CPT

## 2024-01-30 PROCEDURE — 74176 CT ABD & PELVIS W/O CONTRAST: CPT | Performed by: RADIOLOGY

## 2024-01-30 RX ORDER — SODIUM POLYSTYRENE SULFONATE 15 G/60ML
30 SUSPENSION ORAL; RECTAL ONCE
Status: COMPLETED | OUTPATIENT
Start: 2024-01-30 | End: 2024-01-30

## 2024-01-30 RX ORDER — ONDANSETRON HYDROCHLORIDE 2 MG/ML
4 INJECTION, SOLUTION INTRAVENOUS ONCE
Status: COMPLETED | OUTPATIENT
Start: 2024-01-30 | End: 2024-01-30

## 2024-01-30 RX ORDER — ONDANSETRON 4 MG/1
4 TABLET, ORALLY DISINTEGRATING ORAL ONCE
Status: COMPLETED | OUTPATIENT
Start: 2024-01-30 | End: 2024-01-30

## 2024-01-30 RX ADMIN — ONDANSETRON 4 MG: 2 INJECTION INTRAMUSCULAR; INTRAVENOUS at 17:36

## 2024-01-30 RX ADMIN — SODIUM POLYSTYRENE SULFONATE 30 G: 15 SUSPENSION ORAL; RECTAL at 18:57

## 2024-01-30 RX ADMIN — ONDANSETRON 4 MG: 4 TABLET, ORALLY DISINTEGRATING ORAL at 20:41

## 2024-01-30 RX ADMIN — HYDROMORPHONE HYDROCHLORIDE 0.5 MG: 1 INJECTION, SOLUTION INTRAMUSCULAR; INTRAVENOUS; SUBCUTANEOUS at 18:34

## 2024-01-30 ASSESSMENT — COLUMBIA-SUICIDE SEVERITY RATING SCALE - C-SSRS
1. IN THE PAST MONTH, HAVE YOU WISHED YOU WERE DEAD OR WISHED YOU COULD GO TO SLEEP AND NOT WAKE UP?: NO
2. HAVE YOU ACTUALLY HAD ANY THOUGHTS OF KILLING YOURSELF?: NO
6. HAVE YOU EVER DONE ANYTHING, STARTED TO DO ANYTHING, OR PREPARED TO DO ANYTHING TO END YOUR LIFE?: NO

## 2024-01-30 ASSESSMENT — PAIN - FUNCTIONAL ASSESSMENT
PAIN_FUNCTIONAL_ASSESSMENT: 0-10
PAIN_FUNCTIONAL_ASSESSMENT: 0-10

## 2024-01-30 ASSESSMENT — PAIN SCALES - GENERAL: PAINLEVEL_OUTOF10: 8

## 2024-01-31 VITALS
WEIGHT: 261 LBS | DIASTOLIC BLOOD PRESSURE: 59 MMHG | TEMPERATURE: 96.9 F | OXYGEN SATURATION: 100 % | RESPIRATION RATE: 14 BRPM | BODY MASS INDEX: 38.66 KG/M2 | SYSTOLIC BLOOD PRESSURE: 97 MMHG | HEIGHT: 69 IN | HEART RATE: 69 BPM

## 2024-01-31 LAB
ATRIAL RATE: 74 BPM
P AXIS: 43 DEGREES
P OFFSET: 152 MS
P ONSET: 89 MS
PR INTERVAL: 240 MS
Q ONSET: 209 MS
QRS COUNT: 12 BEATS
QRS DURATION: 112 MS
QT INTERVAL: 410 MS
QTC CALCULATION(BAZETT): 455 MS
QTC FREDERICIA: 440 MS
R AXIS: -72 DEGREES
T AXIS: 23 DEGREES
T OFFSET: 414 MS
VENTRICULAR RATE: 74 BPM

## 2024-01-31 NOTE — ED PROVIDER NOTES
HPI   Chief Complaint   Patient presents with    Abdominal Pain     To ED per AFD squad from [facility] with c/o ab pain and N/V. He was not able to complete dialysis today because he vomited. Also is concerned about being constipated.        Patient presents from dialysis for right lower quadrant abdominal pain.  States he was only able to attend about 1 hour of dialysis because he developed pain.  So he was sent here.  I believe it was also reported that he possibly vomited.  Patient is denying any nauseous or vomiting here.  He denies chest pain or shortness of breath.  Patient denies fever or chills.  Patient denies any history of similar.      History provided by:  Patient                      [place] Coma Scale Score: 15                  Patient History   No past medical history on file.  Past Surgical History:   Procedure Laterality Date    CT ANGIO NECK W  10/21/2020    CT NECK ANGIO W AND WO IV CONTRAST 10/21/2020    CT HEAD ANGIO W AND WO IV CONTRAST  10/21/2020    CT HEAD ANGIO W AND WO IV CONTRAST 10/21/2020     No family history on file.  Social History     Tobacco Use    Smoking status: Not on file    Smokeless tobacco: Not on file   Substance Use Topics    Alcohol use: Not on file    Drug use: Not on file       Physical Exam   ED Triage Vitals   Temperature Heart Rate Respirations BP   01/30/24 1618 01/30/24 1618 01/30/24 1618 01/30/24 1618   36.1 °C (96.9 °F) 72 18 99/62      Pulse Ox Temp src Heart Rate Source Patient Position   01/30/24 1618 -- 01/30/24 1833 --   94 %  Monitor       BP Location FiO2 (%)     -- --             Physical Exam  Vitals and nursing note reviewed.   Constitutional:       General: He is not in acute distress.     Appearance: Normal appearance. He is well-developed and well-groomed. He is obese. He is not ill-appearing, toxic-appearing or diaphoretic.   HENT:      Head: Normocephalic.      Right Ear: External ear normal.      Left Ear: External ear normal.      Nose: Nose normal.       Mouth/Throat:      Mouth: Mucous membranes are moist.   Eyes:      General: No scleral icterus.  Cardiovascular:      Rate and Rhythm: Normal rate and regular rhythm.      Heart sounds: Normal heart sounds.   Pulmonary:      Effort: Pulmonary effort is normal.      Breath sounds: Normal breath sounds and air entry.   Abdominal:      General: Bowel sounds are normal.      Palpations: Abdomen is soft.      Tenderness: There is no abdominal tenderness. There is no right CVA tenderness or left CVA tenderness.   Skin:     General: Skin is warm.      Capillary Refill: Capillary refill takes less than 2 seconds.   Neurological:      Mental Status: He is alert and oriented to person, place, and time.      Cranial Nerves: No facial asymmetry.   Psychiatric:         Attention and Perception: Attention and perception normal.         Mood and Affect: Mood normal.         Behavior: Behavior normal. Behavior is cooperative.         Thought Content: Thought content normal.         Judgment: Judgment normal.         ED Course & MDM   Diagnoses as of 01/30/24 1926   Hyperkalemia   Dialysis patient (CMS/[facility])   Abdominal pain, unspecified abdominal location       Medical Decision Making  Patient presents from dialysis for right lower quadrant abdominal pain.  States he was only able to attend about 1 hour of dialysis because he developed pain.  So he was sent here.  I believe it was also reported that he possibly vomited.  Patient is denying any nauseous or vomiting here.  He denies chest pain or shortness of breath.  Patient denies fever or chills.  Patient denies any history of similar.    Ddx: Appendicitis, obstruction, infection, gastroparesis, other    Will obtain labs and CT scan.  This pain was treated with Dilaudid and Zofran IV.  Labs show hyperkalemia otherwise no acute concerns.  Patient is a known dialysis and therefore renal function is significantly abnormal.  CT scan does not show any concerning pathology.   Patient's hyperkalemia was treated with Kayexalate p.o. here.  He is instructed to go to dialysis tomorrow to finish out his treatment.  This will also address his hyperkalemia.  There was an order for urine but patient alerted us that due to his dialysis status he does not produce much if any urine.    Problems Addressed:  Abdominal pain, unspecified abdominal location: undiagnosed new problem with uncertain prognosis     Details: No acute findings noted on CT or lab work  Dialysis patient (CMS/[facility]): chronic illness or injury  Hyperkalemia: acute illness or injury     Details: Treated with Kayexalate here in the ED and then patient should have dialysis tomorrow    Amount and/or Complexity of Data Reviewed  Labs: ordered. Decision-making details documented in ED Course.  Radiology: ordered and independent interpretation performed. Decision-making details documented in ED Course.  ECG/medicine tests: ordered and independent interpretation performed. Decision-making details documented in ED Course.     Details: Read by attending reviewed by myself showing normal sinus rhythm at a rate of 74 bpm.  With a first-degree AV block.  Left axis deviation.  No ST segment elevation    Risk  Risk Details: Chronic medical comorbid conditions.   noncompliance        Procedure  Procedures     Liliya Pena PA-C  01/30/24 1926

## 2024-02-29 ENCOUNTER — NURSING HOME VISIT (OUTPATIENT)
Dept: POST ACUTE CARE | Facility: EXTERNAL LOCATION | Age: 69
End: 2024-02-29
Payer: COMMERCIAL

## 2024-02-29 DIAGNOSIS — N18.6 TYPE 2 DIABETES MELLITUS WITH CHRONIC KIDNEY DISEASE ON CHRONIC DIALYSIS, WITH LONG-TERM CURRENT USE OF INSULIN (MULTI): ICD-10-CM

## 2024-02-29 DIAGNOSIS — Z99.2 TYPE 2 DIABETES MELLITUS WITH CHRONIC KIDNEY DISEASE ON CHRONIC DIALYSIS, WITH LONG-TERM CURRENT USE OF INSULIN (MULTI): ICD-10-CM

## 2024-02-29 DIAGNOSIS — Z99.2 ESRD (END STAGE RENAL DISEASE) ON DIALYSIS (MULTI): Primary | ICD-10-CM

## 2024-02-29 DIAGNOSIS — E11.22 TYPE 2 DIABETES MELLITUS WITH CHRONIC KIDNEY DISEASE ON CHRONIC DIALYSIS, WITH LONG-TERM CURRENT USE OF INSULIN (MULTI): ICD-10-CM

## 2024-02-29 DIAGNOSIS — Z79.4 TYPE 2 DIABETES MELLITUS WITH CHRONIC KIDNEY DISEASE ON CHRONIC DIALYSIS, WITH LONG-TERM CURRENT USE OF INSULIN (MULTI): ICD-10-CM

## 2024-02-29 DIAGNOSIS — N18.6 ESRD (END STAGE RENAL DISEASE) ON DIALYSIS (MULTI): Primary | ICD-10-CM

## 2024-02-29 PROCEDURE — 99308 SBSQ NF CARE LOW MDM 20: CPT | Performed by: INTERNAL MEDICINE

## 2024-02-29 NOTE — LETTER
Patient: Idalmis Daniels  : 1955    Encounter Date: 2024    Pt was seen in the NH.  Pt is in usual state , no complaint  General appearance: Comfortable, no distress  ROS: No SOB  Medications reviewed  Head: Normal  Neck: Soft  Heart: Regular  Lungs: Clear  Abdomen: soft    Impression: clinically doing fine, continue current management    Problem List Items Addressed This Visit       Type 2 diabetes mellitus with chronic kidney disease on chronic dialysis, with long-term current use of insulin (CMS/[facility])    ESRD (end stage renal disease) on dialysis (CMS/[facility]) - Primary          Electronically Signed By: Kuldeep Dwyer MD   24  9:22 PM

## 2024-03-01 NOTE — PROGRESS NOTES
Pt was seen in the NH.  Pt is in usual state , no complaint  General appearance: Comfortable, no distress  ROS: No SOB  Medications reviewed  Head: Normal  Neck: Soft  Heart: Regular  Lungs: Clear  Abdomen: soft    Impression: clinically doing fine, continue current management    Problem List Items Addressed This Visit       Type 2 diabetes mellitus with chronic kidney disease on chronic dialysis, with long-term current use of insulin (CMS/[facility])    ESRD (end stage renal disease) on dialysis (CMS/[facility]) - Primary

## 2024-10-03 DIAGNOSIS — R35.1 NOCTURIA: ICD-10-CM

## 2024-10-03 DIAGNOSIS — R53.83 FATIGUE, UNSPECIFIED TYPE: ICD-10-CM

## 2024-10-24 ENCOUNTER — APPOINTMENT (OUTPATIENT)
Dept: UROLOGY | Facility: CLINIC | Age: 69
End: 2024-10-24
Payer: COMMERCIAL

## 2024-11-20 ASSESSMENT — ENCOUNTER SYMPTOMS
ALLERGIC/IMMUNOLOGIC NEGATIVE: 1
CHILLS: 0
EYES NEGATIVE: 1
ENDOCRINE NEGATIVE: 1
PSYCHIATRIC NEGATIVE: 1
COUGH: 0
SHORTNESS OF BREATH: 0
NAUSEA: 0
DIFFICULTY URINATING: 0
FEVER: 0

## 2024-11-20 NOTE — PROGRESS NOTES
Subjective   Patient ID: Idalmis Daniels is a 69 y.o. male.    HPI  Patient is here for yearly follow up. Most recent PSA was 0.84 on 11/24. Prior PSA was  0.55 on 2/23. Chronic LUTS sx are mild and stable because he makes very little urine. He does HD 3x a week and has been on 15 years. . He does not produce much urine. No medication for LUT'S. ED is chronic. Quadmix PRN. Most recent T level was 244 on 11/24. Prior T level was  269 on 2/23. Patient had CT 1/24 which showed only renal cysts.          Review of Systems   Constitutional:  Negative for chills and fever.   HENT: Negative.     Eyes: Negative.    Respiratory:  Negative for cough and shortness of breath.    Cardiovascular:  Negative for chest pain and leg swelling.   Gastrointestinal:  Negative for nausea.   Endocrine: Negative.    Genitourinary:  Negative for difficulty urinating.        Negative except for documented in HPI   Allergic/Immunologic: Negative.    Neurological:         Alert & oriented X 3   Hematological:         Denies blood thinners   Psychiatric/Behavioral: Negative.         Objective   Physical Exam  Vitals and nursing note reviewed.   Constitutional:       General: He is not in acute distress.     Appearance: Normal appearance.   Pulmonary:      Effort: Pulmonary effort is normal.   Abdominal:      Tenderness: There is no abdominal tenderness.   Genitourinary:     Comments: Kidneys non palpable bilaterally  Bladder non palpable or tender  Scrotum no mass, No hydrocele  Epididymis- No spermatocele. Non Tender.  Testicles: No mass  Urethra: No discharge  Penis within normal limits... No lesions  Prostate - deferred  Toe amputation  Neurological:      Mental Status: He is alert.         Assessment/Plan       Diagnoses and all orders for this visit:  Benign prostatic hyperplasia without lower urinary tract symptoms  Erectile dysfunction, unspecified erectile dysfunction type      All available PSA values reviewed, Options discussed.  Questions answered.   Diet changes for prostate health discussed and educational information given. Pros/Cons of prostate health supplements discussed.   Treatment options for LUTS reviewed-Minimal Sx due to low urine output  Discussed timed voiding. Discussed fluid and caffeine intake  Treatment options for ED reviewed.  QuadMix Rx refilled-faxed to [facility]  Lifestyle change to help prevent UTIs discussed. Encouraged fluid intake.  No recent UTI Sx    F/U 1 year with PSA

## 2024-11-21 ENCOUNTER — APPOINTMENT (OUTPATIENT)
Dept: UROLOGY | Facility: CLINIC | Age: 69
End: 2024-11-21
Payer: COMMERCIAL

## 2024-11-21 VITALS — BODY MASS INDEX: 37.66 KG/M2 | WEIGHT: 255 LBS

## 2024-11-21 DIAGNOSIS — N52.9 ERECTILE DYSFUNCTION, UNSPECIFIED ERECTILE DYSFUNCTION TYPE: ICD-10-CM

## 2024-11-21 DIAGNOSIS — N40.0 BENIGN PROSTATIC HYPERPLASIA WITHOUT LOWER URINARY TRACT SYMPTOMS: ICD-10-CM

## 2024-11-21 PROCEDURE — 99214 OFFICE O/P EST MOD 30 MIN: CPT | Performed by: UROLOGY

## 2024-11-21 PROCEDURE — 4010F ACE/ARB THERAPY RXD/TAKEN: CPT | Performed by: UROLOGY

## 2024-11-21 PROCEDURE — 1036F TOBACCO NON-USER: CPT | Performed by: UROLOGY

## 2024-11-21 PROCEDURE — 1159F MED LIST DOCD IN RCRD: CPT | Performed by: UROLOGY

## 2024-11-21 RX ORDER — ERGOCALCIFEROL 1.25 MG/1
50000 CAPSULE ORAL
COMMUNITY
Start: 2024-04-26

## 2024-11-21 RX ORDER — INSULIN GLARGINE 100 [IU]/ML
INJECTION, SOLUTION SUBCUTANEOUS 2 TIMES DAILY
COMMUNITY
Start: 2024-01-08

## 2024-11-21 RX ORDER — FAMOTIDINE 20 MG/1
20 TABLET, FILM COATED ORAL 2 TIMES DAILY
COMMUNITY
Start: 2024-06-28

## 2024-11-21 RX ORDER — CLOPIDOGREL BISULFATE 75 MG/1
75 TABLET ORAL
COMMUNITY
Start: 2022-12-01

## 2024-11-21 RX ORDER — CINACALCET 90 MG/1
90 TABLET, FILM COATED ORAL
COMMUNITY
Start: 2023-08-15

## 2024-11-21 RX ORDER — CINACALCET HYDROCHLORIDE 60 MG/1
2 TABLET, COATED ORAL
COMMUNITY
Start: 2024-08-28

## 2024-11-21 RX ORDER — ASPIRIN 81 MG/1
81 TABLET ORAL DAILY
COMMUNITY

## 2024-11-21 RX ORDER — HYDROXYZINE HYDROCHLORIDE 25 MG/1
1 TABLET, FILM COATED ORAL
COMMUNITY
Start: 2024-06-07

## 2024-11-21 RX ORDER — SEVELAMER CARBONATE 800 MG/1
1 TABLET, FILM COATED ORAL
COMMUNITY
Start: 2023-08-15

## 2024-11-21 RX ORDER — INSULIN LISPRO 100 [IU]/ML
INJECTION, SOLUTION INTRAVENOUS; SUBCUTANEOUS
COMMUNITY
Start: 2023-05-27

## 2024-11-21 RX ORDER — FLASH GLUCOSE SENSOR
KIT MISCELLANEOUS
COMMUNITY
Start: 2024-10-15

## 2024-11-21 RX ORDER — FOLIC ACID/VIT B COMPLEX AND C 0.8 MG
1 TABLET ORAL
COMMUNITY
Start: 2024-11-16

## 2024-11-21 RX ORDER — LISINOPRIL 2.5 MG/1
1 TABLET ORAL
COMMUNITY
Start: 2024-08-19

## 2024-11-21 RX ORDER — ATORVASTATIN CALCIUM 40 MG/1
40 TABLET, FILM COATED ORAL
COMMUNITY
Start: 2024-04-26

## 2024-11-21 RX ORDER — PREGABALIN 50 MG/1
CAPSULE ORAL EVERY 8 HOURS
COMMUNITY
Start: 2023-10-02

## 2024-11-21 RX ORDER — MIDODRINE HYDROCHLORIDE 10 MG/1
1 TABLET ORAL
COMMUNITY
Start: 2023-10-02

## 2024-11-21 RX ORDER — CALCITRIOL 0.5 UG/1
1 CAPSULE ORAL
COMMUNITY
Start: 2024-01-08

## 2024-11-21 RX ORDER — LATANOPROST 50 UG/ML
SOLUTION/ DROPS OPHTHALMIC
COMMUNITY
Start: 2024-11-09